# Patient Record
Sex: FEMALE | Race: OTHER | Employment: PART TIME | URBAN - METROPOLITAN AREA
[De-identification: names, ages, dates, MRNs, and addresses within clinical notes are randomized per-mention and may not be internally consistent; named-entity substitution may affect disease eponyms.]

---
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
## 2021-11-15 ENCOUNTER — HOSPITAL ENCOUNTER (OUTPATIENT)
Age: 21
Setting detail: OBSERVATION
Discharge: HOME | End: 2021-11-16
Attending: LEGAL MEDICINE | Admitting: LEGAL MEDICINE
Payer: MEDICAID

## 2021-11-15 PROBLEM — M54.9 BACK PAIN: Status: ACTIVE | Noted: 2021-11-15

## 2021-11-15 PROBLEM — Z3A.31 31 WEEKS GESTATION OF PREGNANCY: Status: ACTIVE | Noted: 2021-11-15

## 2021-11-15 LAB
ABO/RH: NORMAL
ALBUMIN SERPL-MCNC: 3.3 G/DL (ref 3.5–5.2)
ALP BLD-CCNC: 171 U/L (ref 35–104)
ALT SERPL-CCNC: 5 U/L (ref 0–32)
AMPHETAMINE SCREEN, URINE: NOT DETECTED
ANION GAP SERPL CALCULATED.3IONS-SCNC: 14 MMOL/L (ref 7–16)
ANTIBODY SCREEN: NORMAL
AST SERPL-CCNC: 8 U/L (ref 0–31)
BACTERIA: ABNORMAL /HPF
BARBITURATE SCREEN URINE: NOT DETECTED
BASOPHILS ABSOLUTE: 0.02 E9/L (ref 0–0.2)
BASOPHILS RELATIVE PERCENT: 0.2 % (ref 0–2)
BENZODIAZEPINE SCREEN, URINE: NOT DETECTED
BILIRUB SERPL-MCNC: <0.2 MG/DL (ref 0–1.2)
BILIRUBIN URINE: NEGATIVE
BLOOD, URINE: ABNORMAL
BUN BLDV-MCNC: 7 MG/DL (ref 6–20)
CALCIUM SERPL-MCNC: 8.5 MG/DL (ref 8.6–10.2)
CANNABINOID SCREEN URINE: POSITIVE
CHLORIDE BLD-SCNC: 103 MMOL/L (ref 98–107)
CLARITY: ABNORMAL
CLUE CELLS: NORMAL
CO2: 19 MMOL/L (ref 22–29)
COCAINE METABOLITE SCREEN URINE: NOT DETECTED
COLOR: YELLOW
CREAT SERPL-MCNC: 0.6 MG/DL (ref 0.5–1)
EOSINOPHILS ABSOLUTE: 0.02 E9/L (ref 0.05–0.5)
EOSINOPHILS RELATIVE PERCENT: 0.2 % (ref 0–6)
FENTANYL SCREEN, URINE: NOT DETECTED
FETAL FIBRONECTIN: NEGATIVE
GFR AFRICAN AMERICAN: >60
GFR NON-AFRICAN AMERICAN: >60 ML/MIN/1.73
GLUCOSE BLD-MCNC: 74 MG/DL (ref 74–99)
GLUCOSE URINE: NEGATIVE MG/DL
HCT VFR BLD CALC: 28.1 % (ref 34–48)
HEMOGLOBIN: 9.1 G/DL (ref 11.5–15.5)
IMMATURE GRANULOCYTES #: 0.07 E9/L
IMMATURE GRANULOCYTES %: 0.6 % (ref 0–5)
KETONES, URINE: NEGATIVE MG/DL
LEUKOCYTE ESTERASE, URINE: ABNORMAL
LYMPHOCYTES ABSOLUTE: 1.49 E9/L (ref 1.5–4)
LYMPHOCYTES RELATIVE PERCENT: 11.7 % (ref 20–42)
Lab: ABNORMAL
MCH RBC QN AUTO: 28.3 PG (ref 26–35)
MCHC RBC AUTO-ENTMCNC: 32.4 % (ref 32–34.5)
MCV RBC AUTO: 87.3 FL (ref 80–99.9)
METHADONE SCREEN, URINE: NOT DETECTED
MONOCYTES ABSOLUTE: 0.96 E9/L (ref 0.1–0.95)
MONOCYTES RELATIVE PERCENT: 7.6 % (ref 2–12)
NEUTROPHILS ABSOLUTE: 10.15 E9/L (ref 1.8–7.3)
NEUTROPHILS RELATIVE PERCENT: 79.7 % (ref 43–80)
NITRITE, URINE: POSITIVE
OPIATE SCREEN URINE: NOT DETECTED
OXYCODONE URINE: NOT DETECTED
PDW BLD-RTO: 13.8 FL (ref 11.5–15)
PH UA: 6.5 (ref 5–9)
PHENCYCLIDINE SCREEN URINE: NOT DETECTED
PLATELET # BLD: 465 E9/L (ref 130–450)
PMV BLD AUTO: 10.8 FL (ref 7–12)
POTASSIUM SERPL-SCNC: 4.1 MMOL/L (ref 3.5–5)
PROTEIN UA: 100 MG/DL
RAPID HIV 1&2: NORMAL
RBC # BLD: 3.22 E12/L (ref 3.5–5.5)
RBC UA: ABNORMAL /HPF (ref 0–2)
SODIUM BLD-SCNC: 136 MMOL/L (ref 132–146)
SOURCE WET PREP: NORMAL
SPECIFIC GRAVITY UA: 1.02 (ref 1–1.03)
TOTAL PROTEIN: 7.4 G/DL (ref 6.4–8.3)
TRICHOMONAS PREP: NORMAL
UROBILINOGEN, URINE: 1 E.U./DL
WBC # BLD: 12.7 E9/L (ref 4.5–11.5)
WBC UA: ABNORMAL /HPF (ref 0–5)
YEAST WET PREP: NORMAL

## 2021-11-15 PROCEDURE — 87591 N.GONORRHOEAE DNA AMP PROB: CPT

## 2021-11-15 PROCEDURE — 87491 CHLMYD TRACH DNA AMP PROBE: CPT

## 2021-11-15 PROCEDURE — 96361 HYDRATE IV INFUSION ADD-ON: CPT

## 2021-11-15 PROCEDURE — 87088 URINE BACTERIA CULTURE: CPT

## 2021-11-15 PROCEDURE — 87077 CULTURE AEROBIC IDENTIFY: CPT

## 2021-11-15 PROCEDURE — 83020 HEMOGLOBIN ELECTROPHORESIS: CPT

## 2021-11-15 PROCEDURE — 86592 SYPHILIS TEST NON-TREP QUAL: CPT

## 2021-11-15 PROCEDURE — 6360000002 HC RX W HCPCS: Performed by: LEGAL MEDICINE

## 2021-11-15 PROCEDURE — 86803 HEPATITIS C AB TEST: CPT

## 2021-11-15 PROCEDURE — G0480 DRUG TEST DEF 1-7 CLASSES: HCPCS

## 2021-11-15 PROCEDURE — 80053 COMPREHEN METABOLIC PANEL: CPT

## 2021-11-15 PROCEDURE — 87340 HEPATITIS B SURFACE AG IA: CPT

## 2021-11-15 PROCEDURE — 81001 URINALYSIS AUTO W/SCOPE: CPT

## 2021-11-15 PROCEDURE — 2580000003 HC RX 258: Performed by: LEGAL MEDICINE

## 2021-11-15 PROCEDURE — 6370000000 HC RX 637 (ALT 250 FOR IP): Performed by: LEGAL MEDICINE

## 2021-11-15 PROCEDURE — 96360 HYDRATION IV INFUSION INIT: CPT

## 2021-11-15 PROCEDURE — 36415 COLL VENOUS BLD VENIPUNCTURE: CPT

## 2021-11-15 PROCEDURE — 87147 CULTURE TYPE IMMUNOLOGIC: CPT

## 2021-11-15 PROCEDURE — 85025 COMPLETE CBC W/AUTO DIFF WBC: CPT

## 2021-11-15 PROCEDURE — 96365 THER/PROPH/DIAG IV INF INIT: CPT

## 2021-11-15 PROCEDURE — 87210 SMEAR WET MOUNT SALINE/INK: CPT

## 2021-11-15 PROCEDURE — 96376 TX/PRO/DX INJ SAME DRUG ADON: CPT

## 2021-11-15 PROCEDURE — 86701 HIV-1ANTIBODY: CPT

## 2021-11-15 PROCEDURE — 87081 CULTURE SCREEN ONLY: CPT

## 2021-11-15 PROCEDURE — 86900 BLOOD TYPING SEROLOGIC ABO: CPT

## 2021-11-15 PROCEDURE — 86762 RUBELLA ANTIBODY: CPT

## 2021-11-15 PROCEDURE — 76815 OB US LIMITED FETUS(S): CPT

## 2021-11-15 PROCEDURE — 85460 HEMOGLOBIN FETAL: CPT

## 2021-11-15 PROCEDURE — 82731 ASSAY OF FETAL FIBRONECTIN: CPT

## 2021-11-15 PROCEDURE — 86850 RBC ANTIBODY SCREEN: CPT

## 2021-11-15 PROCEDURE — 83021 HEMOGLOBIN CHROMOTOGRAPHY: CPT

## 2021-11-15 PROCEDURE — 86901 BLOOD TYPING SEROLOGIC RH(D): CPT

## 2021-11-15 PROCEDURE — 87186 SC STD MICRODIL/AGAR DIL: CPT

## 2021-11-15 PROCEDURE — 80307 DRUG TEST PRSMV CHEM ANLYZR: CPT

## 2021-11-15 PROCEDURE — G0378 HOSPITAL OBSERVATION PER HR: HCPCS

## 2021-11-15 PROCEDURE — 81269 HBA1/HBA2 GENE DUP/DEL VRNTS: CPT

## 2021-11-15 RX ORDER — SODIUM CHLORIDE, SODIUM LACTATE, POTASSIUM CHLORIDE, CALCIUM CHLORIDE 600; 310; 30; 20 MG/100ML; MG/100ML; MG/100ML; MG/100ML
INJECTION, SOLUTION INTRAVENOUS CONTINUOUS
Status: DISCONTINUED | OUTPATIENT
Start: 2021-11-15 | End: 2021-11-16 | Stop reason: HOSPADM

## 2021-11-15 RX ORDER — ACETAMINOPHEN 500 MG
1000 TABLET ORAL EVERY 6 HOURS PRN
Status: DISCONTINUED | OUTPATIENT
Start: 2021-11-15 | End: 2021-11-16 | Stop reason: HOSPADM

## 2021-11-15 RX ADMIN — ACETAMINOPHEN 1000 MG: 500 TABLET ORAL at 15:18

## 2021-11-15 RX ADMIN — SODIUM CHLORIDE, POTASSIUM CHLORIDE, SODIUM LACTATE AND CALCIUM CHLORIDE: 600; 310; 30; 20 INJECTION, SOLUTION INTRAVENOUS at 14:30

## 2021-11-15 RX ADMIN — SODIUM CHLORIDE 3000 MG: 900 INJECTION INTRAVENOUS at 23:53

## 2021-11-15 RX ADMIN — SODIUM CHLORIDE 3000 MG: 900 INJECTION INTRAVENOUS at 17:23

## 2021-11-15 ASSESSMENT — PAIN DESCRIPTION - DESCRIPTORS: DESCRIPTORS: ACHING;DISCOMFORT

## 2021-11-15 ASSESSMENT — PAIN SCALES - GENERAL: PAINLEVEL_OUTOF10: 4

## 2021-11-15 NOTE — PROGRESS NOTES
21 yo  Previous  here to L&D with c/o onset back pain during the night. States she awoke due to c/o heartburn but then started having generalized back discomfort . Also reports lower abdominal pressure feeling and urinary frequency. States she had  N&V shortly thereafter once but denies it on arrival to unit. Reports feeling good fetal activity. States she did not know she was pregnant and went to [facility] to have test to confirm her pregnancy because she was feeling movements. She then scheduled her initial appointment with Lauren Aguilar for tomorrow 21. Appears uncomfortable on arrival. Oriented to Triage-3 then changed into gown and voided in bathroom with urine specimen obtained. Assisted into bed with EFM applied. Call bell in reach.

## 2021-11-15 NOTE — PROGRESS NOTES
Patient with history of recurrent GBS on urine cultures with last pregnancy. U/A c/w UTI. Will start IV Unasyn.

## 2021-11-15 NOTE — PROGRESS NOTES
Dinner tray taken. Emesis 300ml liquid and partially digested food. Denies nausea. States she feels better now since having emesis. Resting quietly.

## 2021-11-15 NOTE — H&P
[id number]  Estimated sonar age 26 weeks.   EDC 1/12/22 with error rate of +/- 3 weeks due to late gestation  [place] in vertex presentation  Anterior grade 2 placenta  Male  EFW 1946 g ( 4 lb 5 oz) +/- 284 g (10 oz)

## 2021-11-15 NOTE — H&P
[address] [place] [address] Mark                              HISTORY AND PHYSICAL    PATIENT NAME: Yary Beth                     :        2000  MED REC NO:   [id number]                            ROOM:       [facility]  ACCOUNT NO:   [de-identified]                           ADMIT DATE: 11/15/2021. PROVIDER:     Kezia Goncalves MD    HISTORY OF PRESENT ILLNESS:  A 81-year-old black female presented on  11/15/2021 with complaints of low central back pain that felt like it  was radiating into her buttocks. The pain had started since 3 days ago. The pain was not radiating. She had not taken anything for the pain. She has a history of back pain in the past, and usually takes Tylenol  and a heating pad and goes to bed. She has not tried it this time. She stated she thought she was pregnant a month ago. She has not had  any prenatal care thus far with the pregnancy. However, she had called  the office in May for an appointment for evaluation for pregnancy per my records. No  fevers or chills. No change in her bowel or urinary habits. No  dysuria. She has frequency and urgency. No chest pain or shortness of  breath. No calf pain. No leaking fluid or vaginal bleeding. PAST MEDICAL HISTORY:  Negative. PAST SURGICAL HISTORY:   performed at 26-1/2 weeks secondary to   premature rupture of membranes and fetal distress. PAST GYNECOLOGIC HISTORY:  Denies DVT, PE, or STDs. SOCIAL HISTORY:  Positive for tobacco, less than a pack per day for 2  years. Denies alcohol or drugs. FAMILY HISTORY:  Negative    ALLERGIES:  No known drug allergies. MEDICATIONS:  Prenatal vitamins. REVIEW OF SYSTEMS:  Negative for cardiac, respiratory, GI, ,  neurologic, psychiatric, ENT, integument, hematologic, lymphatic, or  constitutional abnormalities.   She states she has not gotten the COVID  vaccine and has not been exposed to [place]. PHYSICAL EXAMINATION:  VITAL SIGNS:  Stable. Blood pressure is 108/66, pulse is 102,  respiratory rate 16, temperature 98. 1. HEENT:  Negative. LUNGS:  Clear to auscultation. CV:  Regular rate rhythm. ABDOMEN:  Gravid, soft, nontender. No rebound or guarding. Normal  bowel sounds are present. Cervix is closed and long. EXTREMITIES:  No clubbing, cyanosis, or edema. NEUROLOGIC:  CN II through XII are grossly intact. She is alert and  oriented x4. BACK:  No CVA tenderness. ASSESSMENT:  The patient with unknown gestation with complaints of back  pain. History of . History of  birth. PLAN:  OB ultrasound and biophysical profile. Prenatal panel. CMP. UA. Kleihauer-Betke. Fetal fibronectin. GC and Chlamydia testing. Wet prep. Further management based on clinical findings. OB ultrasound was performed by me with finding of a uriostegui in vertex  presentation with good fetal cardiac activity and movement noted. Anterior grade 2 placenta was noted. The biparietal diameter is 7.5  consistent with 30.1 weeks. OFD is 9.91 cm consistent with 30.3 weeks. Head circumference is 28.14 cm consistent with 30.6 weeks. Abdominal  circumference is 28.41 cm consistent with 32.3 weeks. Femur length is  6.45 cm consistent with 33.2 weeks. The estimated sonar age is 30  weeks. Estimated fetal weight is 1946 gm, which within an error rate of  284 gm and that is equivalent to 4 pounds 5 ounces with an error rate of  10 ounces. Assessment on ultrasound, level 1, is intrauterine pregnancy  at 32 weeks on 11/15/2021, giving her an estimated due date of  01/10/2022, which is accurate plus or minus 3 weeks given the late gestational age of  performance of the ultrasound. Biophysical profile was performed by me with finding of a uriostegui in  vertex presentation with good fetal cardiac activity and movement noted. Anterior grade 2 placenta was noted.   Amniotic fluid indices were 6.06  cm, 5.46 cm, 5.29 cm, and 5.39 cm respectively. Biophysical profile was  8/8 with +2 for fetal tone, breathing movement, and amniotic fluid  pocket greater than 2 cm. Assessment on biophysical profile is normal  biophysical profile with mild polyhydramnios. PLAN:  We will go ahead and await the testing. We will administer  Tylenol and a heating pad. Prenatal profile has been drawn. Further  management based on clinical findings.         Burton Avalos MD    D: 11/15/2021 15:10:06       T: 11/15/2021 15:13:58     [email or web address]  Job#: [id number]     Doc#: [id number]    CC:

## 2021-11-16 VITALS
HEIGHT: 64 IN | BODY MASS INDEX: 26.63 KG/M2 | TEMPERATURE: 98.5 F | SYSTOLIC BLOOD PRESSURE: 111 MMHG | DIASTOLIC BLOOD PRESSURE: 56 MMHG | HEART RATE: 90 BPM | WEIGHT: 156 LBS | RESPIRATION RATE: 18 BRPM

## 2021-11-16 LAB
HEPATITIS B SURFACE ANTIGEN INTERPRETATION: NORMAL
HEPATITIS C ANTIBODY INTERPRETATION: NORMAL
INTEGRITY CHECK, CREATININE, URINE: 102.2
INTEGRITY CHECK, OXIDANT, URINE: 64
INTEGRITY CHECK, PH, URINE: 6 (ref 4.5–9)
INTEGRITY CHECK, SPECIFIC GRAVITY, URINE: 1.01 (ref 1–1.03)
INTEGRITY CHECK, SPECIMEN INTEGRITY, URINE: NORMAL
KLEIHAUER BETKE: NORMAL
RPR: NORMAL
RUBELLA ANTIBODY IGG: NORMAL

## 2021-11-16 PROCEDURE — 6360000002 HC RX W HCPCS: Performed by: LEGAL MEDICINE

## 2021-11-16 PROCEDURE — 99211 OFF/OP EST MAY X REQ PHY/QHP: CPT

## 2021-11-16 PROCEDURE — 96366 THER/PROPH/DIAG IV INF ADDON: CPT

## 2021-11-16 PROCEDURE — 96367 TX/PROPH/DG ADDL SEQ IV INF: CPT

## 2021-11-16 PROCEDURE — 2580000003 HC RX 258: Performed by: LEGAL MEDICINE

## 2021-11-16 PROCEDURE — G0378 HOSPITAL OBSERVATION PER HR: HCPCS

## 2021-11-16 RX ADMIN — SODIUM CHLORIDE 3000 MG: 900 INJECTION INTRAVENOUS at 05:40

## 2021-11-16 RX ADMIN — SODIUM CHLORIDE 3000 MG: 900 INJECTION INTRAVENOUS at 17:30

## 2021-11-16 RX ADMIN — SODIUM CHLORIDE 3000 MG: 900 INJECTION INTRAVENOUS at 11:30

## 2021-11-16 NOTE — PROGRESS NOTES
Assumed care of patient for this shift. Plan of care for night discussed, patient verbalizes understanding. Patient perceives fetal movement and audible per Rn. EFM on, assessed and monitored every 15 minutes. Patient denies any discomfort at this time. Call light placed within reach.

## 2021-11-16 NOTE — PROGRESS NOTES
Patient states feeling indigestion/nausea. Patient states milk helps and requesting milk. Milk given.

## 2021-11-16 NOTE — PROGRESS NOTES
Spoke with dr Arielle Purcell about patient side-lying and changing positions for sleep. Lum Romance for intermittent monitoring.

## 2021-11-16 NOTE — PROGRESS NOTES
Assuming care of patient. Maternal perception of fetal movement noted. Patient denies bleeding, leaking of fluid, contractions, or any other complaints. Call light in reach.

## 2021-11-17 LAB
ORGANISM: ABNORMAL
URINE CULTURE, ROUTINE: ABNORMAL

## 2021-11-17 NOTE — DISCHARGE SUMMARY
[address]                               DISCHARGE SUMMARY    PATIENT NAME: Ghassan Kahn                     :        2000  MED REC NO:   [id number]                            ROOM:       [facility]03  ACCOUNT NO:   [de-identified]                           ADMIT DATE: 11/15/2021. PROVIDER:     Yoana Munoz MD                  DISCHARGE DATE:      ADMITTING DIAGNOSES:  Intrauterine pregnancy at 31-1/2 weeks with low  back pain. No prenatal care. Previous  with T incision. History of recurrent group B strep UTIs with the last pregnancy. History of pelvic abscess and severe infection after her last . History of  birth and premature rupture of membranes with the  last infant. DISCHARGE DIAGNOSES:  Intrauterine pregnancy at 31-1/2 weeks with low  back pain. No prenatal care. Previous  with T incision. History of recurrent group B strep UTIs with the last pregnancy. History of pelvic abscess and severe infection after her last . History of  birth and premature rupture of membranes with the  last infant. The patient threatening to leave against medical advice. HOSPITAL COURSE IN DETAIL:  The patient states her back pain has  resolved. Has good fetal movement. No contractions. Feels well. Wants to go home as she does not have a  for her infant. Reviewed with her that I would recommend to continue IV antibiotics  until tomorrow, given the severity of the infection she had with the  last infant. She is thinking about complying with the recommendation,  but is threatening to leave against medical advice. She does understand  the risk of  birth, premature rupture of membranes and all  consequences of her decision to leave against medical advice. Risk of  fetal demise has also been reviewed with her.   The urine cultures  growing out gram-negative rods, 75,000 in number. Identification and  sensitivity is to follow.         Thomas Singh MD    D: 11/16/2021 20:03:29       T: 11/16/2021 20:05:51     [email or web address]  Job#: [id number]     Doc#: [id number]    CC:

## 2021-11-17 NOTE — PROGRESS NOTES
Dr Bryce Decker on unit. Patient seen, and patient requesting to leave due to no  for her child at home. Dr Bryce Decker educated patient on importance on antibiotic therapy for current UTI due to medical history of infection with last pregnancy. Patient states she will try to find a  to stay, but was doubtful.

## 2021-11-18 LAB — GROUP B STREP CULTURE: NORMAL

## 2021-11-19 LAB
C. TRACHOMATIS DNA ,URINE: ABNORMAL
N. GONORRHOEAE DNA, URINE: ABNORMAL
SOURCE: ABNORMAL

## 2021-11-23 LAB — CANNABINOIDS CONF, URINE: 375 NG/ML

## 2021-12-01 LAB
Lab: NORMAL
REPORT: NORMAL
THIS TEST SENT TO: NORMAL

## 2021-12-11 ENCOUNTER — APPOINTMENT (OUTPATIENT)
Dept: ULTRASOUND IMAGING | Age: 21
DRG: 566 | End: 2021-12-11
Payer: MEDICAID

## 2021-12-11 ENCOUNTER — HOSPITAL ENCOUNTER (INPATIENT)
Age: 21
LOS: 2 days | Discharge: HOME | DRG: 566 | End: 2021-12-15
Attending: LEGAL MEDICINE | Admitting: LEGAL MEDICINE
Payer: MEDICAID

## 2021-12-11 PROBLEM — Z3A.35 35 WEEKS GESTATION OF PREGNANCY: Status: ACTIVE | Noted: 2021-12-11

## 2021-12-11 LAB
ABO/RH: NORMAL
ALBUMIN SERPL-MCNC: 3.4 G/DL (ref 3.5–5.2)
ALP BLD-CCNC: 184 U/L (ref 35–104)
ALT SERPL-CCNC: 7 U/L (ref 0–32)
AMPHETAMINE SCREEN, URINE: NOT DETECTED
ANION GAP SERPL CALCULATED.3IONS-SCNC: 12 MMOL/L (ref 7–16)
ANTIBODY SCREEN: NORMAL
AST SERPL-CCNC: 12 U/L (ref 0–31)
BACTERIA: ABNORMAL /HPF
BARBITURATE SCREEN URINE: NOT DETECTED
BASOPHILS ABSOLUTE: 0.02 E9/L (ref 0–0.2)
BASOPHILS RELATIVE PERCENT: 0.1 % (ref 0–2)
BENZODIAZEPINE SCREEN, URINE: NOT DETECTED
BILIRUB SERPL-MCNC: <0.2 MG/DL (ref 0–1.2)
BILIRUBIN URINE: NEGATIVE
BLOOD, URINE: ABNORMAL
BUN BLDV-MCNC: 10 MG/DL (ref 6–20)
CALCIUM SERPL-MCNC: 8.7 MG/DL (ref 8.6–10.2)
CANNABINOID SCREEN URINE: POSITIVE
CHLORIDE BLD-SCNC: 103 MMOL/L (ref 98–107)
CLARITY: ABNORMAL
CO2: 21 MMOL/L (ref 22–29)
COCAINE METABOLITE SCREEN URINE: NOT DETECTED
COLOR: ABNORMAL
CREAT SERPL-MCNC: 1 MG/DL (ref 0.5–1)
EOSINOPHILS ABSOLUTE: 0 E9/L (ref 0.05–0.5)
EOSINOPHILS RELATIVE PERCENT: 0 % (ref 0–6)
EPITHELIAL CELLS, UA: ABNORMAL /HPF
FENTANYL SCREEN, URINE: NOT DETECTED
GFR AFRICAN AMERICAN: >60
GFR NON-AFRICAN AMERICAN: >60 ML/MIN/1.73
GLUCOSE BLD-MCNC: 73 MG/DL (ref 74–99)
GLUCOSE URINE: NEGATIVE MG/DL
HCT VFR BLD CALC: 30.7 % (ref 34–48)
HEMOGLOBIN: 9.8 G/DL (ref 11.5–15.5)
IMMATURE GRANULOCYTES #: 0.07 E9/L
IMMATURE GRANULOCYTES %: 0.5 % (ref 0–5)
KETONES, URINE: NEGATIVE MG/DL
LEUKOCYTE ESTERASE, URINE: ABNORMAL
LYMPHOCYTES ABSOLUTE: 1.04 E9/L (ref 1.5–4)
LYMPHOCYTES RELATIVE PERCENT: 6.9 % (ref 20–42)
Lab: ABNORMAL
MCH RBC QN AUTO: 27.3 PG (ref 26–35)
MCHC RBC AUTO-ENTMCNC: 31.9 % (ref 32–34.5)
MCV RBC AUTO: 85.5 FL (ref 80–99.9)
METHADONE SCREEN, URINE: NOT DETECTED
MONOCYTES ABSOLUTE: 0.98 E9/L (ref 0.1–0.95)
MONOCYTES RELATIVE PERCENT: 6.5 % (ref 2–12)
NEUTROPHILS ABSOLUTE: 12.86 E9/L (ref 1.8–7.3)
NEUTROPHILS RELATIVE PERCENT: 86 % (ref 43–80)
NITRITE, URINE: POSITIVE
OPIATE SCREEN URINE: NOT DETECTED
OXYCODONE URINE: NOT DETECTED
PDW BLD-RTO: 14.4 FL (ref 11.5–15)
PH UA: 6 (ref 5–9)
PHENCYCLIDINE SCREEN URINE: NOT DETECTED
PLATELET # BLD: 352 E9/L (ref 130–450)
PMV BLD AUTO: 11.3 FL (ref 7–12)
POTASSIUM REFLEX MAGNESIUM: 4.4 MMOL/L (ref 3.5–5)
PROTEIN UA: NEGATIVE MG/DL
RBC # BLD: 3.59 E12/L (ref 3.5–5.5)
RBC UA: ABNORMAL /HPF (ref 0–2)
SARS-COV-2, NAAT: NOT DETECTED
SODIUM BLD-SCNC: 136 MMOL/L (ref 132–146)
SPECIFIC GRAVITY UA: 1.01 (ref 1–1.03)
TOTAL PROTEIN: 7.6 G/DL (ref 6.4–8.3)
UROBILINOGEN, URINE: 1 E.U./DL
WBC # BLD: 15 E9/L (ref 4.5–11.5)
WBC UA: >20 /HPF (ref 0–5)

## 2021-12-11 PROCEDURE — 87147 CULTURE TYPE IMMUNOLOGIC: CPT

## 2021-12-11 PROCEDURE — 81001 URINALYSIS AUTO W/SCOPE: CPT

## 2021-12-11 PROCEDURE — 96361 HYDRATE IV INFUSION ADD-ON: CPT

## 2021-12-11 PROCEDURE — 86850 RBC ANTIBODY SCREEN: CPT

## 2021-12-11 PROCEDURE — 87081 CULTURE SCREEN ONLY: CPT

## 2021-12-11 PROCEDURE — 87088 URINE BACTERIA CULTURE: CPT

## 2021-12-11 PROCEDURE — G0378 HOSPITAL OBSERVATION PER HR: HCPCS

## 2021-12-11 PROCEDURE — 87491 CHLMYD TRACH DNA AMP PROBE: CPT

## 2021-12-11 PROCEDURE — 76805 OB US >/= 14 WKS SNGL FETUS: CPT

## 2021-12-11 PROCEDURE — 87635 SARS-COV-2 COVID-19 AMP PRB: CPT

## 2021-12-11 PROCEDURE — 96375 TX/PRO/DX INJ NEW DRUG ADDON: CPT

## 2021-12-11 PROCEDURE — 83605 ASSAY OF LACTIC ACID: CPT

## 2021-12-11 PROCEDURE — 80307 DRUG TEST PRSMV CHEM ANLYZR: CPT

## 2021-12-11 PROCEDURE — 87186 SC STD MICRODIL/AGAR DIL: CPT

## 2021-12-11 PROCEDURE — 87591 N.GONORRHOEAE DNA AMP PROB: CPT

## 2021-12-11 PROCEDURE — 96360 HYDRATION IV INFUSION INIT: CPT

## 2021-12-11 PROCEDURE — 86901 BLOOD TYPING SEROLOGIC RH(D): CPT

## 2021-12-11 PROCEDURE — 36415 COLL VENOUS BLD VENIPUNCTURE: CPT

## 2021-12-11 PROCEDURE — 96367 TX/PROPH/DG ADDL SEQ IV INF: CPT

## 2021-12-11 PROCEDURE — 2580000003 HC RX 258: Performed by: LEGAL MEDICINE

## 2021-12-11 PROCEDURE — 6370000000 HC RX 637 (ALT 250 FOR IP): Performed by: LEGAL MEDICINE

## 2021-12-11 PROCEDURE — 6360000002 HC RX W HCPCS: Performed by: LEGAL MEDICINE

## 2021-12-11 PROCEDURE — 76819 FETAL BIOPHYS PROFIL W/O NST: CPT

## 2021-12-11 PROCEDURE — 86900 BLOOD TYPING SEROLOGIC ABO: CPT

## 2021-12-11 PROCEDURE — 96365 THER/PROPH/DIAG IV INF INIT: CPT

## 2021-12-11 PROCEDURE — 85025 COMPLETE CBC W/AUTO DIFF WBC: CPT

## 2021-12-11 PROCEDURE — G0480 DRUG TEST DEF 1-7 CLASSES: HCPCS

## 2021-12-11 PROCEDURE — 80053 COMPREHEN METABOLIC PANEL: CPT

## 2021-12-11 PROCEDURE — 87040 BLOOD CULTURE FOR BACTERIA: CPT

## 2021-12-11 RX ORDER — ONDANSETRON 2 MG/ML
4 INJECTION INTRAMUSCULAR; INTRAVENOUS EVERY 6 HOURS PRN
Status: DISCONTINUED | OUTPATIENT
Start: 2021-12-11 | End: 2021-12-15 | Stop reason: HOSPADM

## 2021-12-11 RX ORDER — ACETAMINOPHEN 325 MG/1
650 TABLET ORAL EVERY 4 HOURS PRN
Status: DISCONTINUED | OUTPATIENT
Start: 2021-12-11 | End: 2021-12-15 | Stop reason: HOSPADM

## 2021-12-11 RX ORDER — SODIUM CHLORIDE, SODIUM LACTATE, POTASSIUM CHLORIDE, CALCIUM CHLORIDE 600; 310; 30; 20 MG/100ML; MG/100ML; MG/100ML; MG/100ML
INJECTION, SOLUTION INTRAVENOUS CONTINUOUS
Status: DISCONTINUED | OUTPATIENT
Start: 2021-12-11 | End: 2021-12-15 | Stop reason: HOSPADM

## 2021-12-11 RX ADMIN — SODIUM CHLORIDE, POTASSIUM CHLORIDE, SODIUM LACTATE AND CALCIUM CHLORIDE: 600; 310; 30; 20 INJECTION, SOLUTION INTRAVENOUS at 20:45

## 2021-12-11 RX ADMIN — ONDANSETRON 4 MG: 2 INJECTION INTRAMUSCULAR; INTRAVENOUS at 22:27

## 2021-12-11 RX ADMIN — ACETAMINOPHEN 650 MG: 325 TABLET ORAL at 21:25

## 2021-12-11 RX ADMIN — CEFTRIAXONE 2000 MG: 2 INJECTION, POWDER, FOR SOLUTION INTRAMUSCULAR; INTRAVENOUS at 21:37

## 2021-12-11 ASSESSMENT — PAIN SCALES - GENERAL: PAINLEVEL_OUTOF10: 0

## 2021-12-12 ENCOUNTER — APPOINTMENT (OUTPATIENT)
Dept: ULTRASOUND IMAGING | Age: 21
DRG: 566 | End: 2021-12-12
Payer: MEDICAID

## 2021-12-12 LAB — LACTIC ACID: 0.8 MMOL/L (ref 0.5–2.2)

## 2021-12-12 PROCEDURE — G0378 HOSPITAL OBSERVATION PER HR: HCPCS

## 2021-12-12 PROCEDURE — 96376 TX/PRO/DX INJ SAME DRUG ADON: CPT

## 2021-12-12 PROCEDURE — 96361 HYDRATE IV INFUSION ADD-ON: CPT

## 2021-12-12 PROCEDURE — 96365 THER/PROPH/DIAG IV INF INIT: CPT

## 2021-12-12 PROCEDURE — 6370000000 HC RX 637 (ALT 250 FOR IP): Performed by: LEGAL MEDICINE

## 2021-12-12 PROCEDURE — 96366 THER/PROPH/DIAG IV INF ADDON: CPT

## 2021-12-12 PROCEDURE — 96367 TX/PROPH/DG ADDL SEQ IV INF: CPT

## 2021-12-12 PROCEDURE — 2580000003 HC RX 258: Performed by: LEGAL MEDICINE

## 2021-12-12 PROCEDURE — 76770 US EXAM ABDO BACK WALL COMP: CPT

## 2021-12-12 PROCEDURE — 6360000002 HC RX W HCPCS: Performed by: LEGAL MEDICINE

## 2021-12-12 RX ORDER — SODIUM CHLORIDE 0.9 % (FLUSH) 0.9 %
5-40 SYRINGE (ML) INJECTION 2 TIMES DAILY
Status: DISCONTINUED | OUTPATIENT
Start: 2021-12-12 | End: 2021-12-15 | Stop reason: HOSPADM

## 2021-12-12 RX ORDER — SODIUM CHLORIDE 0.9 % (FLUSH) 0.9 %
5-40 SYRINGE (ML) INJECTION PRN
Status: DISCONTINUED | OUTPATIENT
Start: 2021-12-12 | End: 2021-12-15 | Stop reason: HOSPADM

## 2021-12-12 RX ADMIN — SODIUM CHLORIDE 2000 MG: 9 INJECTION, SOLUTION INTRAMUSCULAR; INTRAVENOUS; SUBCUTANEOUS at 21:36

## 2021-12-12 RX ADMIN — ACETAMINOPHEN 650 MG: 325 TABLET ORAL at 07:41

## 2021-12-12 RX ADMIN — AMPICILLIN SODIUM AND SULBACTAM SODIUM 3000 MG: 2; 1 INJECTION, POWDER, FOR SOLUTION INTRAMUSCULAR; INTRAVENOUS at 10:24

## 2021-12-12 RX ADMIN — AMPICILLIN SODIUM AND SULBACTAM SODIUM 3000 MG: 2; 1 INJECTION, POWDER, FOR SOLUTION INTRAMUSCULAR; INTRAVENOUS at 04:15

## 2021-12-12 RX ADMIN — ACETAMINOPHEN 650 MG: 325 TABLET ORAL at 12:19

## 2021-12-12 RX ADMIN — AMPICILLIN SODIUM AND SULBACTAM SODIUM 3000 MG: 2; 1 INJECTION, POWDER, FOR SOLUTION INTRAMUSCULAR; INTRAVENOUS at 15:48

## 2021-12-12 RX ADMIN — AMPICILLIN SODIUM AND SULBACTAM SODIUM 3000 MG: 2; 1 INJECTION, POWDER, FOR SOLUTION INTRAMUSCULAR; INTRAVENOUS at 21:09

## 2021-12-12 RX ADMIN — ONDANSETRON 4 MG: 2 INJECTION INTRAMUSCULAR; INTRAVENOUS at 23:43

## 2021-12-12 RX ADMIN — ACETAMINOPHEN 650 MG: 325 TABLET ORAL at 15:48

## 2021-12-12 RX ADMIN — ACETAMINOPHEN 650 MG: 325 TABLET ORAL at 01:25

## 2021-12-12 RX ADMIN — SODIUM CHLORIDE, PRESERVATIVE FREE 10 ML: 5 INJECTION INTRAVENOUS at 21:42

## 2021-12-12 RX ADMIN — SODIUM CHLORIDE, PRESERVATIVE FREE 10 ML: 5 INJECTION INTRAVENOUS at 21:35

## 2021-12-12 RX ADMIN — AZITHROMYCIN DIHYDRATE 1000 MG: 500 INJECTION, POWDER, LYOPHILIZED, FOR SOLUTION INTRAVENOUS at 02:40

## 2021-12-12 ASSESSMENT — PAIN DESCRIPTION - DESCRIPTORS: DESCRIPTORS: HEADACHE

## 2021-12-12 ASSESSMENT — PAIN SCALES - GENERAL
PAINLEVEL_OUTOF10: 0
PAINLEVEL_OUTOF10: 6
PAINLEVEL_OUTOF10: 4
PAINLEVEL_OUTOF10: 0
PAINLEVEL_OUTOF10: 4
PAINLEVEL_OUTOF10: 6
PAINLEVEL_OUTOF10: 4

## 2021-12-12 NOTE — PROGRESS NOTES
Patient received with c/o pelvic pressure, pain with voiding and burning. Patient states she was here about a month ago and was treated for UTI. Patient is 35 weeks 3 days gestation. Denies contractions, leaking of fluid and states baby is active.  Dr Delisa Sanchez notified at 1406 and orders received

## 2021-12-12 NOTE — H&P
[address]                              HISTORY AND PHYSICAL    PATIENT NAME: Lisa Muniz                     :        2000  MED REC NO:   [id number]                            ROOM:       [facility]  ACCOUNT NO:   [de-identified]                           ADMIT DATE: 2021. PROVIDER:     Anika Wilkinson MD      HISTORY OF PRESENT ILLNESS:  The patient is a 52-year-old black female,  para 0-1-0-1 who presents with complaints of 3 days of dysuria and  pelvic pressure. She has a history of recurrent GBS UTIs with the last  pregnancy. She was last here on 11/15/2021 with low back pain and UTI. At that time, urine culture grew out E. coli which was sensitive to  ampicillin, cefazolin, ceftriaxone, gentamicin, nitrofurantoin, Bactrim,  and levofloxacin. She continues to be noncompliant with any office  visits and has not had any prenatal care. She began to experience  increasing low back pain 3 days ago, and then subsequently began to  experience fevers and chills 12 hours prior to admission to the  hospital.  She had one episode of emesis when she was in the hospital.   She is passing flatus. She is producing urine. She had a normal bowel  movement a day prior to presentation to the hospital.  Her discomfort in  her lower back, she rates as 4-5 when she presented to the hospital.   She denies a sore throat. She has a cough only after she vomits. She  had a headache after she vomited which was relieved with Tylenol. No  chest pain or shortness of breath. No leaking fluid or vaginal  bleeding. No contractions. No change in fetal movements. No calf  pain. No history of exposure to COVID. PAST MEDICAL HISTORY:  Negative. PAST SURGICAL HISTORY:   at 26-1/2 weeks secondary to   premature rupture of membranes and fetal distress.   She did end up  developing a pelvic infection after the , and had an abscess  that developed postop. PAST GYNECOLOGIC HISTORY:  No DVT, PE, STDs. Last intercourse was  several months ago. SOCIAL HISTORY:  Positive for tobacco.  Less than a pack a day for 2  years. Denies alcohol or drugs. Drug screens have manifested  marijuana. FAMILY HISTORY:  Noncontributory. ALLERGIES:  No known drug allergies. MEDICATIONS:  None. REVIEW OF SYSTEMS:  Negative for cardiac, respiratory, GI, neurologic,  psychiatric, ENT, integument, hematologic, lymphatic, constitutional  abnormalities. She has not received the COVID vaccine. PHYSICAL EXAMINATION:  VITAL SIGNS:  Initially when she came in, her temperature was 99.1. Currently, her temperature is 103. Her pulse is 110, blood pressure  97/54, O2 sat 100% on room air. Respiratory rate 18. HEENT:  Negative. LUNGS:  Clear to auscultation. CV:  Tachycardic, regular rhythm. No CVA tenderness. ABDOMEN:  Gravid, soft and nontender. No rebound or guarding. Normal  bowel sounds are present. PELVIC:  Fetal heart tones are present in the 200s. No contractions are  noted. EXTREMITIES:  No clubbing, cyanosis, edema. No cords or  erythema. LABORATORY DATA:  Her creatinine is 1.0, lactic acid is 0.8, ALT 7, AST  12, glucose 73. Urine drug screen positive for marijuana. White count  15, hemoglobin 9.8, platelets 050,893. UA with greater than 20 WBCs,  2-5 RBCs, rare epithelial cells, moderate bacteria, large leukocyte  esterase. OB ultrasound demonstrates a uriostegui in vertex presentation  with a left lateral placenta and amniotic fluid index of 13.73. Biophysical profile is 8/8. Estimated sonar age is 34-1/2 weeks and the  estimated fetal weight is 2483 gm which is at the 27th percentile. Biophysical profile is 8/8. ASSESSMENT:  Intrauterine pregnancy at 35 weeks and 4 days with  suspected pyelonephritis. Rocephin was started, due to sensitivities  based on her previous urine culture. Due to high creatinine, dosage of Rocephin is limited, so added Unasyn for additional coverage. IV Zithromax to cover chlamydia. PLAN:   Renal ultrasound. Infectious Disease consult. Further management based on  clinical findings. SCDs for thromboprophylaxis. Monitor for   labor.           Amari Juárez MD    D: 2021 2:51:08       T: 2021 2:55:31     [id number]  Job#: [id number]     Doc#: [id number]    CC:

## 2021-12-12 NOTE — PROGRESS NOTES
Dr. Ty Wong reviewed ultrasound, instructed to call regarding request to check the ureteral jet flow. Ultrasound called, informed RN, bladder was not full and unable to review ureter. Dr. Ty Wong informed.

## 2021-12-12 NOTE — PROGRESS NOTES
Dr. Annie Hernandez notified of temperature of 103 unresolved after tylenol administration, FHR tracing, and uterine activity. Orders received.

## 2021-12-12 NOTE — PROGRESS NOTES
Feeling better. Back pain improved. No nausea or vomiting. Voiding well. Has good fetal movement. No contractions. Temperature spiking down. LevelWeightBMI (Calculated)  21 07416 21 413051.7 [id number]69AHZL (Room air) 21 907752.5 (70.3)871138/60234 21 3040591 (39.4) 21 01104 21 9191532.4 (39.1)100 21 00514 21 2025786.4 (16)[id number]/751433 21 6229284660/26 21 6921067 (39.4) 21 21250 21 8084129.9 (38.3) 21 8308100799/57 21 2019None (Room air)0156 lb (70.8 kg)26.8 21 1942100   Lungs CTA  CV RRR  Abdomen gravid soft non-tender  FHT's 140 with +BTBV + accels              Rare contractions    A:IUP at 35.4 weeks by third trimester U/S with pyelo, appears to be responding to Rocephin and Unasyn. Rocephin dose limited by renal function. Moderate bilateral hydronephrosis. Radiologist unable to visualize ureteral jets on U/S due to position of fetal head. Suspect hydronephrosis is physiologic. Previous C/section      H/O  delivery      Noncompliant with prenatal care     P: Continue current antibiotic regimen. Regular diet.

## 2021-12-12 NOTE — CONSULTS
[facility]  Consult Note    [address] [facility] [address]  [place], [address]  Phone [phone]   [phone]      Admit Date: 2021  6:38 PM  Pt Name: Chela Jean  MRN: [id number]  : 2000  Reason for Consult:    Chief Complaint   Patient presents with    Pregnancy Problem     35 weeks 3 days  pelvic pressure     Requesting Physician:  Yoana Munoz MD  PCP: Félix Al MD  History Obtained From:  patient, chart   ID consulted for 35 weeks gestation of pregnancy [Z3A.35]  on hospital day 0  CHIEF COMPLAINT       Chief Complaint   Patient presents with    Pregnancy Problem     35 weeks 3 days  pelvic pressure     HISTORYOF PRESENT ILLNESS   Chela Jean is a 22 y.o. female who presents with   has no past medical history on file.    ED TRIAGEVITALS  BP: (!) 92/50, Temp: 98.6 °F (37 °C), Pulse: 65, Resp: 18, SpO2: 99 %  HPI  Pt came in due uti/backpain  She was admitted on - for uti E coli and left AMA she received unasyn then  Pt comes in uti sx and pressure pt had fevers [id number]  She denies /n/v/d  cr1 wbc15 hgb9.8 mkp556 ua blood nitrite le >wbc bacteria le   +cannabis  US b hydro  Previous urien cx E coli  Current meds   ampicillin-sulbactam (UNASYN) 3000 mg ivpb minibag, Q6H  cefTRIAXone (ROCEPHIN) 2,000 mg in sodium chloride (PF) 20 mL IV syringe, Q24H    She is currently comfortable eating lunch   she has no questions or concerns    REVIEW OF SYSTEMS     CONSTITUTIONAL:   No fever, chills, weight loss  ALLERGIES:    No urticaria, hay fever,    EYES:     No blurry vision, loss of vision, eye pain  ENT:      No hearing loss, sore throat  CARDIOVASCULAR:  No chest pain or palpitations  RESPIRATORY:   No cough, sob  ENDOCRINE:    No increase thirst, urination   HEME-LYMPH:   No easy bruising or bleeding  GI:     No nausea, vomiting or diarrhea  :      urinary complaints  NEURO:    No seizures, stroke, HA  MUSCULOSKELETAL:  No muscle aches or pain, no joint pain  SKIN:     No rash or itch  PSYCH:    No depression or anxiety    Medications Prior to Admission: Prenatal MV-Min-Fe Fum-FA-DHA (PRENATAL 1 PO), Take by mouth  CURRENT MEDICATIONS     Current Facility-Administered Medications:     ampicillin-sulbactam (UNASYN) 3000 mg ivpb minibag, 3,000 mg, IntraVENous, Q6H, Antonio Ridley MD, Stopped at 21 1101    cefTRIAXone (ROCEPHIN) 2,000 mg in sodium chloride (PF) 20 mL IV syringe, 2,000 mg, IntraVENous, Q24H, Antonio Ridley MD    lactated ringers infusion, , IntraVENous, Continuous, Antonio Ridley MD, Last Rate: 125 mL/hr at 21, New Bag at 21    acetaminophen (TYLENOL) tablet 650 mg, 650 mg, Oral, Q4H PRN, Antonio Ridley MD, 650 mg at 21 0741    ondansetron (ZOFRAN) injection 4 mg, 4 mg, IntraVENous, Q6H PRN, Antonio Ridley MD, 4 mg at 21 2227  ALLERGIES     Seasonal  Immunization History   Administered Date(s) Administered    Influenza, Quadv, 6 mo and older, IM, PF (Flulaval, Fluarix) 2019    MMR 2019    Tdap (Boostrix, Adacel) 2019      Internal Administration   First Dose      Second Dose           Last COVID Lab SARS-CoV-2, NAAT (no units)   Date Value   2021 Not Detected            PAST MEDICAL HISTORY   No past medical history on file.   SURGICAL HISTORY       Past Surgical History:   Procedure Laterality Date    CERVICAL CERCLAGE       SECTION N/A 2019     SECTION performed by Romayne Hobby, MD at [facility] L[facility]D    MS OFFICE/OUTPT VISIT,PROCEDURE ONLY N/A 2018    CERVIX CERCLAGE performed by Antonio Ridley MD at [address] [place] [address] HISTORY       Family History   Problem Relation Age of Onset    Colon Cancer Mother     Asthma Mother     Heart Attack Maternal Grandfather      SOCIAL HISTORY       Social History     Socioeconomic History    Marital status: Single     Spouse name: Not on file    Number of children: Not on file    Years of education: Not on file    Highest education level: Not on file   Occupational History    Not on file   Tobacco Use    Smoking status: Current Every Day Smoker     Packs/day: 0.50     Years: 3.00     Pack years: 1.50    Smokeless tobacco: Never Used   Vaping Use    Vaping Use: Never used   Substance and Sexual Activity    Alcohol use: No    Drug use: No    Sexual activity: Not Currently     Comment: placenta previa   Other Topics Concern    Not on file   Social History Narrative    Not on file     Social Determinants of Health     Financial Resource Strain:     Difficulty of Paying Living Expenses: Not on file   Food Insecurity:     Worried About Running Out of Food in the Last Year: Not on file    Leticia of Food in the Last Year: Not on file   Transportation Needs:     Lack of Transportation (Medical): Not on file    Lack of Transportation (Non-Medical):  Not on file   Physical Activity:     Days of Exercise per Week: Not on file    Minutes of Exercise per Session: Not on file   Stress:     Feeling of Stress : Not on file   Social Connections:     Frequency of Communication with Friends and Family: Not on file    Frequency of Social Gatherings with Friends and Family: Not on file    Attends Jainism Services: Not on file    Active Member of [address] [place] [address] [employer] or Organizations: Not on file    Attends Club or Organization Meetings: Not on file    Marital Status: Not on file   Intimate Partner Violence:     Fear of Current or Ex-Partner: Not on file    Emotionally Abused: Not on file    Physically Abused: Not on file    Sexually Abused: Not on file   Housing Stability:     Unable to Pay for Housing in the Last Year: Not on file    Number of [place] in the Last Year: Not on file    Unstable Housing in the Last Year: Not on file     ·     [address]       ED Triage Vitals   BP Temp Temp Source Pulse Resp SpO2 Height Weight   12/11/21 1906 12/11/21 1906 12/11/21 2324 12/11/21 1906 12/11/21 1906 12/11/21 1942 12/11/21 2019 12/11/21 2019   (!) 100/59 99.1 °F (37.3 °C) Oral 102 16 100 % 5' 4\" (1.626 m) 156 lb (70.8 kg)     Vitals:    Vitals:    12/12/21 0052 12/12/21 0150 12/12/21 0420 12/12/21 0731   BP:   (!) 96/55 (!) 92/50   Pulse:   86 65   Resp:   16 18   Temp: 102.4 °F (39.1 °C) 103 °F (39.4 °C) 98.3 °F (36.8 °C) 98.6 °F (37 °C)   TempSrc: Oral Oral Oral Oral   SpO2: 100%  100% 99%   Weight:       Height:         Physical Exam   Constitutional/General: Alert and oriented, NAD  Head: NC/AT  Eyes: PERRL, EOMI  Mouth: Normal mucosa, no thrush   Neck: Supple, full ROM,    Pulmonary: Lungs clear to auscultation bilaterally. Not in respiratory distress  Cardiovascular:  Regular rate and rhythm, no murmurs, gallops, or rubs. Abdomen: Soft, + BS.   distension. Nontender. Extremities: Moves all extremities x 4. Warm and well perfused  Pulses:  Distal pulses intact  Skin: Warm and dry without rash  Neurologic:    No focal deficits  Psych: Normal Affect     DIAGNOSTIC RESULTS   RADIOLOGY:   US OB 14 PLUS WEEKS SINGLE OR FIRST GESTATION    Result Date: 12/11/2021  EXAMINATION: TRANSABDOMINAL SECOND/THIRD TRIMESTER OBSTETRIC PELVIC ULTRASOUND WITH COLOR DOPPLER FLOW; BIOPHYSICAL PROFILE WITHOUT NON-STRESS TEST 12/11/2021 TECHNIQUE: TRANSABDOMINAL PELVIC ULTRASOUND WITH COLOR DOPPLER FLOW; ULTRASOUND BIOPHYSICAL PROFILE WITHOUT NON-STRESS TEST COMPARISON: None available. HISTORY: ORDERING SYSTEM PROVIDED HISTORY: limited prenatal visits TECHNOLOGIST PROVIDED HISTORY: Reason for exam:->limited prenatal visits What reading provider will be dictating this exam?->CRC; ORDERING SYSTEM PROVIDED HISTORY: fetal wellbeing TECHNOLOGIST PROVIDED HISTORY: With MARIAELENA please Reason for exam:->fetal wellbeing What reading provider will be dictating this exam?->CRC FINDINGS: A single live intrauterine pregnancy is present. Fetal heart rate measures 150 beats per minute. There is normal fetal body and limb movement.  The None available. HISTORY: ORDERING SYSTEM PROVIDED HISTORY: limited prenatal visits TECHNOLOGIST PROVIDED HISTORY: Reason for exam:->limited prenatal visits What reading provider will be dictating this exam?->CRC; ORDERING SYSTEM PROVIDED HISTORY: fetal wellbeing TECHNOLOGIST PROVIDED HISTORY: With MARIAELENA please Reason for exam:->fetal wellbeing What reading provider will be dictating this exam?->CRC FINDINGS: A single live intrauterine pregnancy is present. Fetal heart rate measures 150 beats per minute. There is normal fetal body and limb movement. The fetus is in cephalic position. The placenta is located left lateral. The amniotic fluid volume is visually normal and the amniotic fluid index measures 13.73 cm. The cervix is not well seen secondary to shadowing from the fetal calvarium. Systolic to diastolic ratios were 2.0, 2.2, and 2.0 (normal for gestational age). BPD measures 8.73 cm. 35 weeks and 2 days. Head circumference measures 31.23 cm. 35 weeks and 0 days. Abdominal circumference measures 30.51 cm. 34 weeks and 3 days. Femur length measures 6.75 cm. 34 weeks and 5 days. Estimated fetal weight is 2483 grams which correlates to 27th percentile based upon last menstrual period. BIOPHYSICAL PROFILE: Fetal Tone:  2/2 Gross Body:  2/2 Breathin/2 Qualitative Fluid:  2/2 Biophysical Profile Score:  8/8 Estimated gestational age by current ultrasound is 34 weeks and 6 days Estimated date of delivery based on current ultrasound: 2022 Clinical age provided: 27 weeks and 3 days Estimated date of delivery based on clinical age provided: 2022.     1.  Single live intrauterine pregnancy with gestational age of 29 weeks and 6 days by current sonographic biometry which is concordant with the gestational age provided of 27 weeks and 3 days. The estimated due date based on current ultrasound is 7922. 2.  Cephalic presentation with no evidence of previa.   Fetal heart rate was 150 beats per minute. Visually normal amniotic fluid. Estimated fetal weight was 2483 g. 3.  Normal biophysical profile, 8 of 8. Systolic to diastolic ratios ranged from 2.0-2.2.     US RETROPERITONEAL COMPLETE    Result Date: 12/12/2021  EXAMINATION: RETROPERITONEAL ULTRASOUND OF THE KIDNEYS AND URINARY BLADDER 12/12/2021 COMPARISON: January 7, 2019 HISTORY: ORDERING SYSTEM PROVIDED HISTORY: urinary tract infection FINDINGS: Kidneys: The right kidney measures 10.9 x 6.3 x 5.9 cm and the left kidney measures 13.3 x 6.9 x 7.9 cm. Kidneys demonstrate normal cortical echogenicity. Moderate bilateral hydronephrosis noted. No evidence of intrarenal stones. Bladder: The partially distended bladder is grossly unremarkable. Moderate bilateral hydronephrosis. LABS  Recent Labs     12/11/21 2115   WBC 15.0*   HGB 9.8*   HCT 30.7*   MCV 85.5        Recent Labs     12/11/21 2115      K 4.4      CO2 21*   BUN 10   CREATININE 1.0   GFRAA >60   LABGLOM >60   GLUCOSE 73*   PROT 7.6   LABALBU 3.4*   CALCIUM 8.7   BILITOT <0.2   ALKPHOS 184*   AST 12   ALT 7     No results for input(s): PROCAL in the last 72 hours.   Lab Results   Component Value Date    CRP 1.8 (H) 01/08/2019    CRP 7.3 (H) 01/01/2019     Lab Results   Component Value Date    SEDRATE 62 (H) 01/08/2019    SEDRATE 52 (H) 01/01/2019     Lab Results   Component Value Date    [id number] see below 11/15/2021     Lab Results   Component Value Date    COVID19 Not Detected 12/11/2021     COVID-19/CONSTANCE-COV2 LABS  Recent Labs     12/11/21 2115   AST 12   ALT 7     No results found for: CHOL, TRIG, HDL, LDLCALC, LABVLDL      Lab Results   Component Value Date    HCVABI Non-Reactive 11/15/2021     Hep C Ab Interp   Date Value Ref Range Status   11/15/2021 Non-Reactive Non-Reactive Final       MICROBIOLOGY:       Lab Results   Component Value Date    BC 5 Days- no growth 01/01/2019    ORG Escherichia coli 11/15/2021    ORG FILM ARR

## 2021-12-12 NOTE — PROGRESS NOTES
Results of labs called to Dr Bryce Decker. Notified of spike in temp and blood cultures sent. Notified of fhts reactive to elevated temp, now beginning to resolve at 180. Notified of vomitting and negative covid status.   New orders received

## 2021-12-13 PROBLEM — N12 PYELONEPHRITIS: Status: ACTIVE | Noted: 2021-12-13

## 2021-12-13 LAB
ALBUMIN SERPL-MCNC: 2.4 G/DL (ref 3.5–5.2)
ALP BLD-CCNC: 129 U/L (ref 35–104)
ALT SERPL-CCNC: 5 U/L (ref 0–32)
ANION GAP SERPL CALCULATED.3IONS-SCNC: 12 MMOL/L (ref 7–16)
AST SERPL-CCNC: 8 U/L (ref 0–31)
BILIRUB SERPL-MCNC: <0.2 MG/DL (ref 0–1.2)
BUN BLDV-MCNC: 7 MG/DL (ref 6–20)
CALCIUM SERPL-MCNC: 7.8 MG/DL (ref 8.6–10.2)
CHLORIDE BLD-SCNC: 106 MMOL/L (ref 98–107)
CO2: 18 MMOL/L (ref 22–29)
CREAT SERPL-MCNC: 0.8 MG/DL (ref 0.5–1)
GFR AFRICAN AMERICAN: >60
GFR NON-AFRICAN AMERICAN: >60 ML/MIN/1.73
GLUCOSE BLD-MCNC: 77 MG/DL (ref 74–99)
HCT VFR BLD CALC: 25.5 % (ref 34–48)
HEMOGLOBIN: 8.2 G/DL (ref 11.5–15.5)
INTEGRITY CHECK, CREATININE, URINE: 177.9
INTEGRITY CHECK, OXIDANT, URINE: 40
INTEGRITY CHECK, PH, URINE: 5.4 (ref 4.5–9)
INTEGRITY CHECK, SPECIFIC GRAVITY, URINE: 1.01 (ref 1–1.03)
INTEGRITY CHECK, SPECIMEN INTEGRITY, URINE: NORMAL
MCH RBC QN AUTO: 27.5 PG (ref 26–35)
MCHC RBC AUTO-ENTMCNC: 32.2 % (ref 32–34.5)
MCV RBC AUTO: 85.6 FL (ref 80–99.9)
PDW BLD-RTO: 14.6 FL (ref 11.5–15)
PLATELET # BLD: 321 E9/L (ref 130–450)
PMV BLD AUTO: 11.3 FL (ref 7–12)
POTASSIUM SERPL-SCNC: 3.5 MMOL/L (ref 3.5–5)
RBC # BLD: 2.98 E12/L (ref 3.5–5.5)
SODIUM BLD-SCNC: 136 MMOL/L (ref 132–146)
TOTAL PROTEIN: 5.6 G/DL (ref 6.4–8.3)
WBC # BLD: 13.6 E9/L (ref 4.5–11.5)

## 2021-12-13 PROCEDURE — 2580000003 HC RX 258: Performed by: LEGAL MEDICINE

## 2021-12-13 PROCEDURE — 85027 COMPLETE CBC AUTOMATED: CPT

## 2021-12-13 PROCEDURE — 6360000002 HC RX W HCPCS: Performed by: LEGAL MEDICINE

## 2021-12-13 PROCEDURE — 80053 COMPREHEN METABOLIC PANEL: CPT

## 2021-12-13 PROCEDURE — 6370000000 HC RX 637 (ALT 250 FOR IP): Performed by: LEGAL MEDICINE

## 2021-12-13 PROCEDURE — 96366 THER/PROPH/DIAG IV INF ADDON: CPT

## 2021-12-13 PROCEDURE — 96361 HYDRATE IV INFUSION ADD-ON: CPT

## 2021-12-13 PROCEDURE — 36415 COLL VENOUS BLD VENIPUNCTURE: CPT

## 2021-12-13 PROCEDURE — 1220000001 HC SEMI PRIVATE L&D R&B

## 2021-12-13 RX ORDER — FERROUS SULFATE 325(65) MG
325 TABLET ORAL 2 TIMES DAILY WITH MEALS
Status: DISCONTINUED | OUTPATIENT
Start: 2021-12-13 | End: 2021-12-15 | Stop reason: HOSPADM

## 2021-12-13 RX ORDER — MULTIVIT WITH MINERALS/LUTEIN
250 TABLET ORAL 2 TIMES DAILY
Status: DISCONTINUED | OUTPATIENT
Start: 2021-12-13 | End: 2021-12-15 | Stop reason: HOSPADM

## 2021-12-13 RX ADMIN — FERROUS SULFATE TAB 325 MG (65 MG ELEMENTAL FE) 325 MG: 325 (65 FE) TAB at 09:13

## 2021-12-13 RX ADMIN — FERROUS SULFATE TAB 325 MG (65 MG ELEMENTAL FE) 325 MG: 325 (65 FE) TAB at 17:14

## 2021-12-13 RX ADMIN — SODIUM CHLORIDE, POTASSIUM CHLORIDE, SODIUM LACTATE AND CALCIUM CHLORIDE: 600; 310; 30; 20 INJECTION, SOLUTION INTRAVENOUS at 10:43

## 2021-12-13 RX ADMIN — AMPICILLIN SODIUM AND SULBACTAM SODIUM 3000 MG: 2; 1 INJECTION, POWDER, FOR SOLUTION INTRAMUSCULAR; INTRAVENOUS at 08:21

## 2021-12-13 RX ADMIN — AMPICILLIN SODIUM AND SULBACTAM SODIUM 3000 MG: 2; 1 INJECTION, POWDER, FOR SOLUTION INTRAMUSCULAR; INTRAVENOUS at 21:05

## 2021-12-13 RX ADMIN — Medication 250 MG: at 10:15

## 2021-12-13 RX ADMIN — AMPICILLIN SODIUM AND SULBACTAM SODIUM 3000 MG: 2; 1 INJECTION, POWDER, FOR SOLUTION INTRAMUSCULAR; INTRAVENOUS at 02:29

## 2021-12-13 RX ADMIN — SODIUM CHLORIDE 2000 MG: 9 INJECTION, SOLUTION INTRAMUSCULAR; INTRAVENOUS; SUBCUTANEOUS at 22:17

## 2021-12-13 RX ADMIN — AMPICILLIN SODIUM AND SULBACTAM SODIUM 3000 MG: 2; 1 INJECTION, POWDER, FOR SOLUTION INTRAMUSCULAR; INTRAVENOUS at 14:41

## 2021-12-13 RX ADMIN — Medication 250 MG: at 21:00

## 2021-12-13 RX ADMIN — ACETAMINOPHEN 650 MG: 325 TABLET ORAL at 01:00

## 2021-12-13 ASSESSMENT — PAIN DESCRIPTION - ORIENTATION: ORIENTATION: MID;LOWER

## 2021-12-13 ASSESSMENT — PAIN DESCRIPTION - FREQUENCY: FREQUENCY: INTERMITTENT

## 2021-12-13 ASSESSMENT — PAIN SCALES - GENERAL: PAINLEVEL_OUTOF10: 2

## 2021-12-13 ASSESSMENT — PAIN DESCRIPTION - DESCRIPTORS: DESCRIPTORS: DISCOMFORT;CONSTANT

## 2021-12-13 ASSESSMENT — PAIN DESCRIPTION - PAIN TYPE: TYPE: ACUTE PAIN

## 2021-12-13 ASSESSMENT — PAIN - FUNCTIONAL ASSESSMENT: PAIN_FUNCTIONAL_ASSESSMENT: ACTIVITIES ARE NOT PREVENTED

## 2021-12-13 ASSESSMENT — PAIN DESCRIPTION - LOCATION: LOCATION: BACK

## 2021-12-13 ASSESSMENT — PAIN DESCRIPTION - ONSET: ONSET: ON-GOING

## 2021-12-13 NOTE — PROGRESS NOTES
Assumed care of patient at 0. Plan of care discussed with patient, who verbalized understanding with no questions at 0. Patient resting in bed comfortably, IV fluids infusing. Patient denies any pain, denies any nausea, and is satisfied at this time 1915. White board updated, call light within reach.

## 2021-12-13 NOTE — PROGRESS NOTES
Assuming care of patient. Patient resting in bed comfortably, denies any complaints at this time. Maternal perception of fetal movement noted. Plan of care discussed with patient; patient verbalizes understanding. Call light in reach.

## 2021-12-14 LAB
COMMENT: NORMAL
ORGANISM: ABNORMAL
THC NORMALIZED, QUANTITIATIVE, URINE: 34.2
THC-COOH, QUANTITATIVE, URINE: 60.8
URINE CULTURE, ROUTINE: ABNORMAL

## 2021-12-14 PROCEDURE — 1220000001 HC SEMI PRIVATE L&D R&B

## 2021-12-14 PROCEDURE — 6360000002 HC RX W HCPCS: Performed by: LEGAL MEDICINE

## 2021-12-14 PROCEDURE — 6370000000 HC RX 637 (ALT 250 FOR IP): Performed by: LEGAL MEDICINE

## 2021-12-14 PROCEDURE — 2580000003 HC RX 258: Performed by: LEGAL MEDICINE

## 2021-12-14 PROCEDURE — 87449 NOS EACH ORGANISM AG IA: CPT

## 2021-12-14 PROCEDURE — 87324 CLOSTRIDIUM AG IA: CPT

## 2021-12-14 PROCEDURE — 6370000000 HC RX 637 (ALT 250 FOR IP): Performed by: SPECIALIST

## 2021-12-14 RX ORDER — CEFDINIR 300 MG/1
300 CAPSULE ORAL EVERY 12 HOURS SCHEDULED
Qty: 28 CAPSULE | Refills: 0 | Status: ON HOLD | OUTPATIENT
Start: 2021-12-14 | End: 2021-12-29 | Stop reason: HOSPADM

## 2021-12-14 RX ORDER — CEFDINIR 300 MG/1
300 CAPSULE ORAL EVERY 12 HOURS SCHEDULED
Status: DISCONTINUED | OUTPATIENT
Start: 2021-12-14 | End: 2021-12-15 | Stop reason: HOSPADM

## 2021-12-14 RX ADMIN — CEFDINIR 300 MG: 300 CAPSULE ORAL at 23:37

## 2021-12-14 RX ADMIN — Medication 250 MG: at 17:30

## 2021-12-14 RX ADMIN — FERROUS SULFATE TAB 325 MG (65 MG ELEMENTAL FE) 325 MG: 325 (65 FE) TAB at 17:30

## 2021-12-14 RX ADMIN — Medication 250 MG: at 09:02

## 2021-12-14 RX ADMIN — SODIUM CHLORIDE, POTASSIUM CHLORIDE, SODIUM LACTATE AND CALCIUM CHLORIDE: 600; 310; 30; 20 INJECTION, SOLUTION INTRAVENOUS at 12:21

## 2021-12-14 RX ADMIN — AMPICILLIN SODIUM AND SULBACTAM SODIUM 3000 MG: 2; 1 INJECTION, POWDER, FOR SOLUTION INTRAMUSCULAR; INTRAVENOUS at 03:14

## 2021-12-14 RX ADMIN — CEFDINIR 300 MG: 300 CAPSULE ORAL at 11:16

## 2021-12-14 RX ADMIN — FERROUS SULFATE TAB 325 MG (65 MG ELEMENTAL FE) 325 MG: 325 (65 FE) TAB at 09:02

## 2021-12-14 ASSESSMENT — PAIN SCALES - GENERAL: PAINLEVEL_OUTOF10: 0

## 2021-12-14 NOTE — PROGRESS NOTES
[facility]    [address] [place] [address] 80  [place], [address] [place] [address] Street  Phone [phone]   [phone]      Admit Date: 2021  6:38 PM  Pt Name: Caitlin Peter  MRN: [id number]  : 2000  Reason for Consult:    Chief Complaint   Patient presents with    Pregnancy Problem     35 weeks 3 days  pelvic pressure     Requesting Physician:  Antonio Ridley MD  PCP: Darline Singh MD  History Obtained From:  patient, chart   ID consulted for 35 weeks gestation of pregnancy [Z3A.35]  Pyelonephritis [N12]  on hospital day [address]       Chief Complaint   Patient presents with    Pregnancy Problem     35 weeks 3 days  pelvic pressure     HISTORYOF PRESENT ILLNESS   Caitlin Peter is a 22 y.o. female who presents with   has no past medical history on file.    HPI  Pt came in due uti/backpain  She was admitted on - for uti E coli and left AMA she received unasyn then  Pt comes in uti sx and pressure pt had fevers [id number]  She denies /n/v/d  cr1 wbc15 hgb9.8 yck324 ua blood nitrite le >wbc bacteria le   +cannabis  US b hydro  Previous urien cx E coli  Current meds   ampicillin-sulbactam (UNASYN) 3000 mg ivpb minibag, Q6H  cefTRIAXone (ROCEPHIN) 2,000 mg in sodium chloride (PF) 20 mL IV syringe, Q24H    She is currently comfortable eating lunch   she has no questions or concerns    DOS  22   Awake and alert in bed has no c/o   Afebrile on RA  Cr0.8 wbc13.6     REVIEW OF SYSTEMS     CONSTITUTIONAL:   No fever, chills, weight loss  ALLERGIES:    No urticaria, hay fever,    EYES:     No blurry vision, loss of vision, eye pain  ENT:      No hearing loss, sore throat  CARDIOVASCULAR:  No chest pain or palpitations  RESPIRATORY:   No cough, sob  ENDOCRINE:    No increase thirst, urination   HEME-LYMPH:   No easy bruising or bleeding  GI:     No nausea, vomiting or diarrhea  :      urinary complaints  NEURO:    No seizures, stroke, HA  MUSCULOSKELETAL:  No muscle aches or pain, no joint pain  SKIN:     No rash or itch  PSYCH:    No depression or anxiety    Medications Prior to Admission: Prenatal MV-Min-Fe Fum-FA-DHA (PRENATAL 1 PO), Take by mouth  CURRENT MEDICATIONS     Current Facility-Administered Medications:     ferrous sulfate (IRON 325) tablet 325 mg, 325 mg, Oral, BID WC, Shea Ramesh MD, 325 mg at 21 1714    vitamin C tablet 250 mg, 250 mg, Oral, BID, Shea Ramesh MD, 250 mg at 21 2100    ampicillin-sulbactam (UNASYN) 3000 mg ivpb minibag, 3,000 mg, IntraVENous, Q6H, Shea Ramesh MD, Stopped at 21 0400    cefTRIAXone (ROCEPHIN) 2,000 mg in sodium chloride (PF) 20 mL IV syringe, 2,000 mg, IntraVENous, Q24H, Shea Ramesh MD, 2,000 mg at 21 2217    sodium chloride flush 0.9 % injection 5-40 mL, 5-40 mL, IntraVENous, BID, Shea Ramesh MD, 10 mL at 21 2142    sodium chloride flush 0.9 % injection 5-40 mL, 5-40 mL, IntraVENous, PRN, Shea Ramesh MD, 10 mL at 21 2135    lactated ringers infusion, , IntraVENous, Continuous, Shea Ramesh MD, Last Rate: 80 mL/hr at 21 1733, Rate Change at 21 1733    acetaminophen (TYLENOL) tablet 650 mg, 650 mg, Oral, Q4H PRN, Shea Ramesh MD, 650 mg at 21 0100    ondansetron (ZOFRAN) injection 4 mg, 4 mg, IntraVENous, Q6H PRN, Shea Ramesh MD, 4 mg at 21 2343  ALLERGIES     Seasonal  Immunization History   Administered Date(s) Administered    Influenza, Quadv, 6 mo and older, IM, PF (Flulaval, Fluarix) 2019    MMR 2019    Tdap (Boostrix, Adacel) 2019      Internal Administration   First Dose      Second Dose           Last COVID Lab SARS-CoV-2, NAAT (no units)   Date Value   2021 Not Detected            PAST MEDICAL HISTORY   No past medical history on file.   SURGICAL HISTORY       Past Surgical History:   Procedure Laterality Date    CERVICAL CERCLAGE       SECTION N/A 2019     SECTION performed [facility] at [facility] L[facility]    NV OFFICE/OUTPT VISIT,PROCEDURE ONLY N/A 11/8/2018    CERVIX CERCLAGE performed by Shea Ramesh MD at [address] [place] [address] Avenue:    12/13/21 1604 12/14/21 0014 12/14/21 0400 12/14/21 0735   BP:  (!) 104/56 111/63 101/64   Pulse:  75 80 78   Resp: 14 16 17 18   Temp: 98.3 °F (36.8 °C) 97.9 °F (36.6 °C) 98.1 °F (36.7 °C) 97.8 °F (36.6 °C)   TempSrc: Oral Oral Oral Oral   SpO2: 96% 98%     Weight:       Height:         Physical Exam   Constitutional/General: Alert and oriented, NAD  Head: NC/AT  Eyes: PERRL,    Pulmonary: Lungs clear to auscultation bilaterally. ant  Cardiovascular:  Regular rate and rhythm,   Abdomen: Soft, + BS.   distension. Extremities: Moves all extremities x 4. Skin: Warm and dry without rash  Neurologic:    No focal deficits  Psych: Normal Affect     DIAGNOSTIC RESULTS   RADIOLOGY:   US OB 14 PLUS WEEKS SINGLE OR FIRST GESTATION    Result Date: 12/11/2021  EXAMINATION: TRANSABDOMINAL SECOND/THIRD TRIMESTER OBSTETRIC PELVIC ULTRASOUND WITH COLOR DOPPLER FLOW; BIOPHYSICAL PROFILE WITHOUT NON-STRESS TEST 12/11/2021 TECHNIQUE: TRANSABDOMINAL PELVIC ULTRASOUND WITH COLOR DOPPLER FLOW; ULTRASOUND BIOPHYSICAL PROFILE WITHOUT NON-STRESS TEST COMPARISON: None available. HISTORY: ORDERING SYSTEM PROVIDED HISTORY: limited prenatal visits TECHNOLOGIST PROVIDED HISTORY: Reason for exam:->limited prenatal visits What reading provider will be dictating this exam?->CRC; ORDERING SYSTEM PROVIDED HISTORY: fetal wellbeing TECHNOLOGIST PROVIDED HISTORY: With MARIAELENA please Reason for exam:->fetal wellbeing What reading provider will be dictating this exam?->CRC FINDINGS: A single live intrauterine pregnancy is present. Fetal heart rate measures 150 beats per minute. There is normal fetal body and limb movement. The fetus is in cephalic position.  The placenta is located left lateral. The amniotic fluid volume is visually normal and the amniotic fluid index measures 13.73 cm. The cervix is not well seen secondary to shadowing from the fetal calvarium. Systolic to diastolic ratios were 2.0, 2.2, and 2.0 (normal for gestational age). BPD measures 8.73 cm. 35 weeks and 2 days. Head circumference measures 31.23 cm. 35 weeks and 0 days. Abdominal circumference measures 30.51 cm. 34 weeks and 3 days. Femur length measures 6.75 cm. 34 weeks and 5 days. Estimated fetal weight is 2483 grams which correlates to 27th percentile based upon last menstrual period. BIOPHYSICAL PROFILE: Fetal Tone:  2/2 Gross Body:  2/2 Breathin/2 Qualitative Fluid:  2/2 Biophysical Profile Score:  8/8 Estimated gestational age by current ultrasound is 34 weeks and 6 days Estimated date of delivery based on current ultrasound: 2022 Clinical age provided: 27 weeks and 3 days Estimated date of delivery based on clinical age provided: 2022.     1.  Single live intrauterine pregnancy with gestational age of 29 weeks and 6 days by current sonographic biometry which is concordant with the gestational age provided of 27 weeks and 3 days. The estimated due date based on current ultrasound is 5480. 2.  Cephalic presentation with no evidence of previa. Fetal heart rate was 150 beats per minute. Visually normal amniotic fluid. Estimated fetal weight was 2483 g. 3.  Normal biophysical profile, 8 of 8. Systolic to diastolic ratios ranged from 2.0-2.2.     US FETAL BIOPHYSICAL PROFILE WO NON STRESS TESTING    Result Date: 2021  EXAMINATION: TRANSABDOMINAL SECOND/THIRD TRIMESTER OBSTETRIC PELVIC ULTRASOUND WITH COLOR DOPPLER FLOW; BIOPHYSICAL PROFILE WITHOUT NON-STRESS TEST 2021 TECHNIQUE: TRANSABDOMINAL PELVIC ULTRASOUND WITH COLOR DOPPLER FLOW; ULTRASOUND BIOPHYSICAL PROFILE WITHOUT NON-STRESS TEST COMPARISON: None available.  HISTORY: ORDERING SYSTEM PROVIDED HISTORY: limited prenatal visits TECHNOLOGIST PROVIDED HISTORY: Reason for exam:->limited prenatal visits What reading provider will be dictating this exam?->CRC; ORDERING SYSTEM PROVIDED HISTORY: fetal wellbeing TECHNOLOGIST PROVIDED HISTORY: With MARIAELENA please Reason for exam:->fetal wellbeing What reading provider will be dictating this exam?->CRC FINDINGS: A single live intrauterine pregnancy is present. Fetal heart rate measures 150 beats per minute. There is normal fetal body and limb movement. The fetus is in cephalic position. The placenta is located left lateral. The amniotic fluid volume is visually normal and the amniotic fluid index measures 13.73 cm. The cervix is not well seen secondary to shadowing from the fetal calvarium. Systolic to diastolic ratios were 2.0, 2.2, and 2.0 (normal for gestational age). BPD measures 8.73 cm. 35 weeks and 2 days. Head circumference measures 31.23 cm. 35 weeks and 0 days. Abdominal circumference measures 30.51 cm. 34 weeks and 3 days. Femur length measures 6.75 cm. 34 weeks and 5 days. Estimated fetal weight is 2483 grams which correlates to 27th percentile based upon last menstrual period. BIOPHYSICAL PROFILE: Fetal Tone:  2/2 Gross Body:  2/2 Breathin/2 Qualitative Fluid:  2/2 Biophysical Profile Score:  8/8 Estimated gestational age by current ultrasound is 34 weeks and 6 days Estimated date of delivery based on current ultrasound: 2022 Clinical age provided: 27 weeks and 3 days Estimated date of delivery based on clinical age provided: 2022.     1.  Single live intrauterine pregnancy with gestational age of 29 weeks and 6 days by current sonographic biometry which is concordant with the gestational age provided of 27 weeks and 3 days. The estimated due date based on current ultrasound is 6709. 2.  Cephalic presentation with no evidence of previa. Fetal heart rate was 150 beats per minute. Visually normal amniotic fluid. Estimated fetal weight was 2483 g. 3.  Normal biophysical profile, 8 of 8. Systolic to diastolic ratios ranged from 2.0-2.2.     US RETROPERITONEAL COMPLETE    Result Date: 12/12/2021  EXAMINATION: RETROPERITONEAL ULTRASOUND OF THE KIDNEYS AND URINARY BLADDER 12/12/2021 COMPARISON: January 7, 2019 HISTORY: ORDERING SYSTEM PROVIDED HISTORY: urinary tract infection FINDINGS: Kidneys: The right kidney measures 10.9 x 6.3 x 5.9 cm and the left kidney measures 13.3 x 6.9 x 7.9 cm. Kidneys demonstrate normal cortical echogenicity. Moderate bilateral hydronephrosis noted. No evidence of intrarenal stones. Bladder: The partially distended bladder is grossly unremarkable. Moderate bilateral hydronephrosis. LABS  Recent Labs     12/11/21 2115 12/13/21  0825   WBC 15.0* 13.6*   HGB 9.8* 8.2*   HCT 30.7* 25.5*   MCV 85.5 85.6    321     Recent Labs     12/11/21 2115 12/11/21 2115 12/13/21  0825     --  136   K 4.4  --  3.5      < > 106   CO2 21*   < > 18*   BUN 10  --  7   CREATININE 1.0  --  0.8   GFRAA >60  --  >60   LABGLOM >60  --  >60   GLUCOSE 73*  --  77   PROT 7.6  --  5.6*   LABALBU 3.4*  --  2.4*   CALCIUM 8.7  --  7.8*   BILITOT <0.2  --  <0.2   ALKPHOS 184*  --  129*   AST 12  --  8   ALT 7  --  5    < > = values in this interval not displayed. No results for input(s): PROCAL in the last 72 hours.   Lab Results   Component Value Date    CRP 1.8 (H) 01/08/2019    CRP 7.3 (H) 01/01/2019     Lab Results   Component Value Date    SEDRATE 62 (H) 01/08/2019    SEDRATE 52 (H) 01/01/2019     Lab Results   Component Value Date    DNPCYPE6O9 see below 11/15/2021     Lab Results   Component Value Date    COVID19 Not Detected 12/11/2021     COVID-19/CONSTANCE-COV2 LABS  Recent Labs     12/11/21 2115 12/13/21  0825   AST 12 8   ALT 7 5     No results found for: CHOL, TRIG, HDL, LDLCALC, LABVLDL      Lab Results   Component Value Date    HCVABI Non-Reactive 11/15/2021     Hep C Ab Interp   Date Value Ref Range Status   11/15/2021 [facility] MICROBIOLOGY:       Lab Results   Component Value Date    BC 24 Hours no growth 12/11/2021    BC 5 Days- no growth 01/01/2019    ORG Escherichia coli 12/11/2021    ORG Escherichia coli 11/15/2021    ORG FILM ARR Rhinovirus/Enterovirus Detected  01/01/2019     Lab Results   Component Value Date    BLOODCULT2 24 Hours no growth 12/11/2021    BLOODCULT2 5 Days- no growth 01/01/2019    ORG Escherichia coli 12/11/2021    ORG Escherichia coli 11/15/2021    ORG FILM ARR Rhinovirus/Enterovirus Detected  01/01/2019       Body Fluid Culture, Sterile   Date Value Ref Range Status   01/08/2019 Growth not present  Final     No results found for: CXSURG  Urine Culture, Routine   Date Value Ref Range Status   12/11/2021 >100,000 CFU/ml  Final   11/15/2021 75,000 CFU/ml  Final       FINAL IMPRESSION    Patient is a 22 y.o. female who presented with   Chief Complaint   Patient presents with    Pregnancy Problem     35 weeks 3 days  pelvic pressure    and admitted for 35 weeks gestation of pregnancy [Z3A.35]  Pyelonephritis [N12]   sepsis  leukocytosis better  afebrile   complicated uti/E coli/hydronephosis? pyelonephritis  -cont atbx  Sx better  final cx  E coli can d/c with omnicef  Blood c xngtd        Imaging and labs were reviewed per medical records        An opportunity to ask questions was given to the patient/FAMILY and questions were answered. Thank you for involving me in the care of [facility]. Please do not hesitate to call for any questions or concerns.          Electronically signed by Everett Presley MD on 12/14/2021 at 8:38 AM

## 2021-12-15 VITALS
BODY MASS INDEX: 26.63 KG/M2 | RESPIRATION RATE: 16 BRPM | HEIGHT: 64 IN | HEART RATE: 78 BPM | WEIGHT: 156 LBS | DIASTOLIC BLOOD PRESSURE: 59 MMHG | OXYGEN SATURATION: 98 % | TEMPERATURE: 97.6 F | SYSTOLIC BLOOD PRESSURE: 118 MMHG

## 2021-12-15 LAB
C DIFF TOXIN/ANTIGEN: NORMAL
C. TRACHOMATIS DNA ,URINE: NEGATIVE
GROUP B STREP CULTURE: ABNORMAL
N. GONORRHOEAE DNA, URINE: NEGATIVE
ORGANISM: ABNORMAL
SOURCE: NORMAL

## 2021-12-15 PROCEDURE — 6370000000 HC RX 637 (ALT 250 FOR IP): Performed by: LEGAL MEDICINE

## 2021-12-15 PROCEDURE — 6370000000 HC RX 637 (ALT 250 FOR IP): Performed by: SPECIALIST

## 2021-12-15 RX ADMIN — CEFDINIR 300 MG: 300 CAPSULE ORAL at 11:15

## 2021-12-15 RX ADMIN — FERROUS SULFATE TAB 325 MG (65 MG ELEMENTAL FE) 325 MG: 325 (65 FE) TAB at 09:08

## 2021-12-15 NOTE — PROGRESS NOTES
Dr. Bryce Decker notified of loss of IV access, no new access needed as patient is tolerating a general diet. Also notified that patient recently stated she has been experiencing diarrhea since last evening. Dr. Bryce Decker to round on patient.

## 2021-12-15 NOTE — DISCHARGE SUMMARY
[address]                               DISCHARGE SUMMARY    PATIENT NAME: Lisa                      :        2000  MED REC NO:   [id number]                            ROOM:       [facility]04  ACCOUNT NO:   [de-identified]                           ADMIT DATE: 2021. PROVIDER:     Kristin Zamudio MD                  DISCHARGE DATE:  12/15/2021    ADDENDUM    The patient remained afebrile for 48 hours, including 24 hour on oral  antibiotics. Her diarrhea had decreased. She had no further complaints  and felt ready to go home. She had good fetal movement. No leaking  fluid or vaginal bleeding. No contractions. No nausea or vomiting. Her back pain had resolved. Her vital signs were stable. She was,  therefore, discharged home with fetal movement and  labor  precautions. Prescription for Omnicef 300 mg b.i.d. for 2 weeks was  ordered for her. She is to follow up at the office on Monday. She  indicated understanding of all instructions.         Bennett Giron MD    D: 12/15/2021 16:31:12       T: 12/15/2021 16:33:24     [email or web address]  Job#: [id number]     Doc#: [id number]    CC:

## 2021-12-15 NOTE — PROGRESS NOTES
[address] [facility]    [address] [place] [address]  [place], [address] [place] [address] Street  Phone [phone]   [phone]      Admit Date: 2021  6:38 PM  Pt Name: Lidya Walden  MRN: [id number]  : 2000  Reason for Consult:    Chief Complaint   Patient presents with    Pregnancy Problem     35 weeks 3 days  pelvic pressure     Requesting Physician:  Jaylin Rodriguez MD  PCP: Jyoti Lechuga MD  History Obtained From:  patient, chart   ID consulted for 35 weeks gestation of pregnancy [Z3A.35]  Pyelonephritis [N12]  on hospital day [address]       Chief Complaint   Patient presents with    Pregnancy Problem     35 weeks 3 days  pelvic pressure     HISTORYOF PRESENT ILLNESS   Lidya Walden is a 22 y.o. female who presents with   has no past medical history on file.    HPI  Pt came in due uti/back pain  She was admitted on - for uti E coli and left AMA she received unasyn then  Pt comes in uti sx and pressure pt had fevers [id number]  She denies /n/v/d  cr1 wbc15 hgb9.8 liv065 ua blood nitrite le >wbc bacteria le   +cannabis  US b hydro  Previous urien cx E coli  Current meds   ampicillin-sulbactam (UNASYN) 3000 mg ivpb minibag, Q6H  cefTRIAXone (ROCEPHIN) 2,000 mg in sodium chloride (PF) 20 mL IV syringe, Q24H    She is currently comfortable eating lunch   she has no questions or concerns    DOS  12/15/21   Awake and alert in bed has no c/o f/c/n/v/d/yeast infection no uti sx   Afebrile on RA       REVIEW OF SYSTEMS     CONSTITUTIONAL:   No fever, chills, weight loss  ALLERGIES:    No urticaria, hay fever,    EYES:     No blurry vision, loss of vision, eye pain  ENT:      No hearing loss, sore throat  CARDIOVASCULAR:  No chest pain or palpitations  RESPIRATORY:   No cough, sob  ENDOCRINE:    No increase thirst, urination   HEME-LYMPH:   No easy bruising or bleeding  GI:     No nausea, vomiting or diarrhea  :     barbara urinary complaints  NEURO:    No seizures, stroke, HA  MUSCULOSKELETAL:  No muscle aches or pain, no joint pain  SKIN:     No rash or itch  PSYCH:    No depression or anxiety    Medications Prior to Admission: Prenatal MV-Min-Fe Fum-FA-DHA (PRENATAL 1 PO), Take by mouth  CURRENT MEDICATIONS     Current Facility-Administered Medications:     cefdinir (OMNICEF) capsule 300 mg, 300 mg, Oral, 2 times per day, Dionte Casarez MD, 300 mg at 21 2337    ferrous sulfate (IRON 325) tablet 325 mg, 325 mg, Oral, BID WC, Martin Valles MD, 325 mg at 21 1730    vitamin C tablet 250 mg, 250 mg, Oral, BID, Martin Valles MD, 250 mg at 21 1730    sodium chloride flush 0.9 % injection 5-40 mL, 5-40 mL, IntraVENous, BID, Martin Valles MD, 10 mL at 21 2142    sodium chloride flush 0.9 % injection 5-40 mL, 5-40 mL, IntraVENous, PRN, Martin Valles MD, 10 mL at 21 2135    lactated ringers infusion, , IntraVENous, Continuous, Martin Valles MD, Stopped at 21 1822    acetaminophen (TYLENOL) tablet 650 mg, 650 mg, Oral, Q4H PRN, Martin Valles MD, 650 mg at 21 0100    ondansetron (ZOFRAN) injection 4 mg, 4 mg, IntraVENous, Q6H PRN, Martin Valles MD, 4 mg at 21 2343  ALLERGIES     Seasonal  Immunization History   Administered Date(s) Administered    Influenza, Quadv, 6 mo and older, IM, PF (Flulaval, Fluarix) 2019    MMR 2019    Tdap (Boostrix, Adacel) 2019      Internal Administration   First Dose      Second Dose           Last COVID Lab SARS-CoV-2, NAAT (no units)   Date Value   2021 Not Detected            PAST MEDICAL HISTORY   No past medical history on file.   SURGICAL HISTORY       Past Surgical History:   Procedure Laterality Date    CERVICAL CERCLAGE       SECTION N/A 2019     SECTION performed [facility] L[facility] L[facility]D    IN OFFICE/OUTPT VISIT,PROCEDURE ONLY N/A 2018    CERVIX CERCLAGE performed by Martin Valles MD at [address] [place] [address] Vitals:    12/14/21 1117 12/14/21 1521 12/14/21 1924 12/14/21 2335   BP: 102/64 109/75 (!) 98/54 116/61   Pulse: 65 82 85 71   Resp: 14 16 15 16   Temp: 97.7 °F (36.5 °C) 97.6 °F (36.4 °C) 97.8 °F (36.6 °C) 97.6 °F (36.4 °C)   TempSrc: Oral Oral Oral Axillary   SpO2:  96%  100%   Weight:       Height:         Physical Exam   Constitutional/General: Alert and oriented, NAD in bed  Head: NC/AT  Eyes: PERRL, EOMI   Pulmonary: Lungs clear to auscultation bilaterally noweeze  Cardiovascular:  Regular rate and rhythm,   Abdomen: Soft, + BS.   distension. Nontender. Extremities: Moves all extremities x 4. Skin: Warm and dry    Neurologic:    No focal deficits  Psych: Normal Affect     DIAGNOSTIC RESULTS   RADIOLOGY:   US OB 14 PLUS WEEKS SINGLE OR FIRST GESTATION    Result Date: 12/11/2021  EXAMINATION: TRANSABDOMINAL SECOND/THIRD TRIMESTER OBSTETRIC PELVIC ULTRASOUND WITH COLOR DOPPLER FLOW; BIOPHYSICAL PROFILE WITHOUT NON-STRESS TEST 12/11/2021 TECHNIQUE: TRANSABDOMINAL PELVIC ULTRASOUND WITH COLOR DOPPLER FLOW; ULTRASOUND BIOPHYSICAL PROFILE WITHOUT NON-STRESS TEST COMPARISON: None available. HISTORY: ORDERING SYSTEM PROVIDED HISTORY: limited prenatal visits TECHNOLOGIST PROVIDED HISTORY: Reason for exam:->limited prenatal visits What reading provider will be dictating this exam?->CRC; ORDERING SYSTEM PROVIDED HISTORY: fetal wellbeing TECHNOLOGIST PROVIDED HISTORY: With MARIAELENA please Reason for exam:->fetal wellbeing What reading provider will be dictating this exam?->CRC FINDINGS: A single live intrauterine pregnancy is present. Fetal heart rate measures 150 beats per minute. There is normal fetal body and limb movement. The fetus is in cephalic position. The placenta is located left lateral. The amniotic fluid volume is visually normal and the amniotic fluid index measures 13.73 cm. The cervix is not well seen secondary to shadowing from the fetal calvarium.  Systolic to diastolic ratios were 2.0, 2.2, and 2.0 With MARIAELENA please Reason for exam:->fetal wellbeing What reading provider will be dictating this exam?->CRC FINDINGS: A single live intrauterine pregnancy is present. Fetal heart rate measures 150 beats per minute. There is normal fetal body and limb movement. The fetus is in cephalic position. The placenta is located left lateral. The amniotic fluid volume is visually normal and the amniotic fluid index measures 13.73 cm. The cervix is not well seen secondary to shadowing from the fetal calvarium. Systolic to diastolic ratios were 2.0, 2.2, and 2.0 (normal for gestational age). BPD measures 8.73 cm. 35 weeks and 2 days. Head circumference measures 31.23 cm. 35 weeks and 0 days. Abdominal circumference measures 30.51 cm. 34 weeks and 3 days. Femur length measures 6.75 cm. 34 weeks and 5 days. Estimated fetal weight is 2483 grams which correlates to 27th percentile based upon last menstrual period. BIOPHYSICAL PROFILE: Fetal Tone:  2/2 Gross Body:  2/2 Breathin/2 Qualitative Fluid:  2/2 Biophysical Profile Score:  8/8 Estimated gestational age by current ultrasound is 34 weeks and 6 days Estimated date of delivery based on current ultrasound: 2022 Clinical age provided: 27 weeks and 3 days Estimated date of delivery based on clinical age provided: 2022.     1.  Single live intrauterine pregnancy with gestational age of 29 weeks and 6 days by current sonographic biometry which is concordant with the gestational age provided of 27 weeks and 3 days. The estimated due date based on current ultrasound is 9780. 2.  Cephalic presentation with no evidence of previa. Fetal heart rate was 150 beats per minute. Visually normal amniotic fluid. Estimated fetal weight was 2483 g. 3.  Normal biophysical profile, 8 of 8.   Systolic to diastolic ratios ranged from 2.0-2.2.     US RETROPERITONEAL COMPLETE    Result Date: 2021  EXAMINATION: RETROPERITONEAL ULTRASOUND OF THE KIDNEYS AND URINARY BLADDER 12/12/2021 COMPARISON: January 7, 2019 HISTORY: ORDERING SYSTEM PROVIDED HISTORY: urinary tract infection FINDINGS: Kidneys: The right kidney measures 10.9 x 6.3 x 5.9 cm and the left kidney measures 13.3 x 6.9 x 7.9 cm. Kidneys demonstrate normal cortical echogenicity. Moderate bilateral hydronephrosis noted. No evidence of intrarenal stones. Bladder: The partially distended bladder is grossly unremarkable. Moderate bilateral hydronephrosis. LABS  Recent Labs     12/13/21  0825   WBC 13.6*   HGB 8.2*   HCT 25.5*   MCV 85.6        Recent Labs     12/13/21  0825      K 3.5      CO2 18*   BUN 7   CREATININE 0.8   GFRAA >60   LABGLOM >60   GLUCOSE 77   PROT 5.6*   LABALBU 2.4*   CALCIUM 7.8*   BILITOT <0.2   ALKPHOS 129*   AST 8   ALT 5     No results for input(s): PROCAL in the last 72 hours.   Lab Results   Component Value Date    CRP 1.8 (H) 01/08/2019    CRP 7.3 (H) 01/01/2019     Lab Results   Component Value Date    SEDRATE 62 (H) 01/08/2019    SEDRATE 52 (H) 01/01/2019     Lab Results   Component Value Date    GMSBFCL3C4 see below 11/15/2021     Lab Results   Component Value Date    COVID19 Not Detected 12/11/2021     COVID-19/CONSTANCE-COV2 LABS  Recent Labs     12/13/21  0825   AST 8   ALT 5     No results found for: CHOL, TRIG, HDL, LDLCALC, LABVLDL      Lab Results   Component Value Date    HCVABI Non-Reactive 11/15/2021     Hep C Ab Interp   Date Value Ref Range Status   11/15/2021 Non-Reactive Non-Reactive Final       MICROBIOLOGY:       Lab Results   Component Value Date    BC 24 Hours no growth 12/11/2021    BC 5 Days- no growth 01/01/2019    ORG Escherichia coli 12/11/2021    ORG Escherichia coli 11/15/2021    ORG FILM ARR Rhinovirus/Enterovirus Detected  01/01/2019     Lab Results   Component Value Date    BLOODCULT2 24 Hours no growth 12/11/2021    BLOODCULT2 5 Days- no growth 01/01/2019    ORG Escherichia coli 12/11/2021    ORG Escherichia coli 11/15/2021 ORG FILM ARR Rhinovirus/Enterovirus Detected  01/01/2019       Body Fluid Culture, Sterile   Date Value Ref Range Status   01/08/2019 Growth not present  Final     No results found for: CXSURG  Urine Culture, Routine   Date Value Ref Range Status   12/11/2021 >100,000 CFU/ml  Final   11/15/2021 75,000 CFU/ml  Final       FINAL IMPRESSION    Patient is a 22 y.o. female who presented with   Chief Complaint   Patient presents with    Pregnancy Problem     35 weeks 3 days  pelvic pressure    and admitted for 35 weeks gestation of pregnancy [Z3A.35]  Pyelonephritis [N12]  sepsis  leukocytosis   Remains afebrile    complicated uti/E coli/hydronephosis? pyelonephritis  -cont atbx  Sx better   E coli can d/c with omnicef for 2 weeks  Pt med rec  Blood c xngtd        Imaging and labs were reviewed per medical records        An opportunity to ask questions was given to the patient/FAMILY and questions were answered. Thank you for involving me in the care of [facility]. Please do not hesitate to call for any questions or concerns.          Electronically signed by Francisco J Castro MD on 12/15/2021 at 8:50 AM

## 2021-12-15 NOTE — PROGRESS NOTES
Assumed care of patient, RN bedside, plan of care discussed. Patient educated on need for stool specimen for testing, agreeable. Patient denies discomfort at this time. Comfort measures provided. Call light within reach.

## 2021-12-15 NOTE — DISCHARGE SUMMARY
[address]                               DISCHARGE SUMMARY    PATIENT NAME: Dionisio Conroy                     :        2000  MED REC NO:   [id number]                            ROOM:       [facility]  ACCOUNT NO:   [de-identified]                           ADMIT DATE: 11/15/2021. PROVIDER:     Gayatri Vasquez MD                  DISCHARGE DATE:    ADMITTING DIAGNOSIS:  Intrauterine pregnancy at 35-1/2 weeks with back  pain. DISCHARGE DIAGNOSES:  Intrauterine pregnancy at 35-1/2 weeks with back  pain, E. coli pyelonephritis. HOSPITAL COURSE IN DETAIL:  The patient has now been afebrile for 36  hours. She did state that she has started to experience some watery  stools starting last night. No nausea or vomiting. Passing flatus. No  contractions. Has good fetal movement. Her back pain has resolved. Urine culture grew out E. coli. It is sensitive to cefazolin. She is  afebrile. Heart rate is 82-85. Blood pressure /75-54. O2 sat  96% on room air. Lungs:  Clear to auscultation. CV:  Regular rate and  rhythm. Abdomen:  Gravid, soft, nontender. Fetal heart tones are  present in the 140s with fftz-wi-ltnl variability present. Accelerations noted. No contractions. Extremities:  No clubbing,  cyanosis, edema. No cords or erythema. ASSESSMENT:  The patient intrauterine pregnancy at 35 weeks and 6 days  with pyelonephritis, responding to therapy. New onset diarrhea. PLAN:  Stool C. diff. She was switched over from IV antibiotics Omnicef  by Infectious Disease.   Further management based on clinical findings        Mayco Croft MD    D: 2021 20:07:40       T: 2021 20:10:06     [email or web address]  Job#: [id number]     Doc#: [id number]    CC:

## 2021-12-17 LAB
BLOOD CULTURE, ROUTINE: NORMAL
CULTURE, BLOOD 2: NORMAL

## 2021-12-26 ENCOUNTER — ANESTHESIA EVENT (OUTPATIENT)
Dept: LABOR AND DELIVERY | Age: 21
DRG: 540 | End: 2021-12-26
Payer: MEDICAID

## 2021-12-26 ENCOUNTER — HOSPITAL ENCOUNTER (INPATIENT)
Age: 21
LOS: 3 days | Discharge: HOME | DRG: 540 | End: 2021-12-29
Attending: LEGAL MEDICINE | Admitting: LEGAL MEDICINE
Payer: MEDICAID

## 2021-12-26 ENCOUNTER — ANESTHESIA (OUTPATIENT)
Dept: LABOR AND DELIVERY | Age: 21
DRG: 540 | End: 2021-12-26
Payer: MEDICAID

## 2021-12-26 VITALS
OXYGEN SATURATION: 98 % | RESPIRATION RATE: 15 BRPM | TEMPERATURE: 97.7 F | SYSTOLIC BLOOD PRESSURE: 123 MMHG | DIASTOLIC BLOOD PRESSURE: 65 MMHG

## 2021-12-26 PROBLEM — Z34.90 TERM PREGNANCY: Status: ACTIVE | Noted: 2021-12-26

## 2021-12-26 LAB
ABO/RH: NORMAL
ALBUMIN SERPL-MCNC: 3.6 G/DL (ref 3.5–5.2)
ALP BLD-CCNC: 191 U/L (ref 35–104)
ALT SERPL-CCNC: 8 U/L (ref 0–32)
AMNISURE, POC: POSITIVE
AMPHETAMINE SCREEN, URINE: NOT DETECTED
ANION GAP SERPL CALCULATED.3IONS-SCNC: 14 MMOL/L (ref 7–16)
ANTIBODY SCREEN: NORMAL
AST SERPL-CCNC: 15 U/L (ref 0–31)
BACTERIA: ABNORMAL /HPF
BARBITURATE SCREEN URINE: NOT DETECTED
BENZODIAZEPINE SCREEN, URINE: NOT DETECTED
BILIRUB SERPL-MCNC: 0.2 MG/DL (ref 0–1.2)
BILIRUBIN URINE: NEGATIVE
BLOOD, URINE: ABNORMAL
BUN BLDV-MCNC: 10 MG/DL (ref 6–20)
CALCIUM SERPL-MCNC: 8.5 MG/DL (ref 8.6–10.2)
CANNABINOID SCREEN URINE: POSITIVE
CHLORIDE BLD-SCNC: 101 MMOL/L (ref 98–107)
CLARITY: CLEAR
CO2: 18 MMOL/L (ref 22–29)
COCAINE METABOLITE SCREEN URINE: NOT DETECTED
COLOR: YELLOW
CREAT SERPL-MCNC: 0.7 MG/DL (ref 0.5–1)
EPITHELIAL CELLS, UA: ABNORMAL /HPF
FENTANYL SCREEN, URINE: NOT DETECTED
GFR AFRICAN AMERICAN: >60
GFR NON-AFRICAN AMERICAN: >60 ML/MIN/1.73
GLUCOSE BLD-MCNC: 69 MG/DL (ref 74–99)
GLUCOSE URINE: NEGATIVE MG/DL
HCT VFR BLD CALC: 29.2 % (ref 34–48)
HEMOGLOBIN: 9.2 G/DL (ref 11.5–15.5)
KETONES, URINE: NEGATIVE MG/DL
LEUKOCYTE ESTERASE, URINE: ABNORMAL
Lab: ABNORMAL
Lab: ABNORMAL
MCH RBC QN AUTO: 26.4 PG (ref 26–35)
MCHC RBC AUTO-ENTMCNC: 31.5 % (ref 32–34.5)
MCV RBC AUTO: 83.9 FL (ref 80–99.9)
METHADONE SCREEN, URINE: NOT DETECTED
NEGATIVE QC PASS/FAIL: ABNORMAL
NITRITE, URINE: NEGATIVE
OPIATE SCREEN URINE: NOT DETECTED
OXYCODONE URINE: NOT DETECTED
PDW BLD-RTO: 14.6 FL (ref 11.5–15)
PH UA: 7.5 (ref 5–9)
PHENCYCLIDINE SCREEN URINE: NOT DETECTED
PLATELET # BLD: 477 E9/L (ref 130–450)
PMV BLD AUTO: 10.6 FL (ref 7–12)
POSITIVE QC PASS/FAIL: ABNORMAL
POTASSIUM SERPL-SCNC: 4.3 MMOL/L (ref 3.5–5)
PROTEIN UA: NEGATIVE MG/DL
RBC # BLD: 3.48 E12/L (ref 3.5–5.5)
RBC UA: ABNORMAL /HPF (ref 0–2)
SARS-COV-2, NAAT: DETECTED
SODIUM BLD-SCNC: 133 MMOL/L (ref 132–146)
SPECIFIC GRAVITY UA: 1.01 (ref 1–1.03)
TOTAL PROTEIN: 7.6 G/DL (ref 6.4–8.3)
UROBILINOGEN, URINE: 0.2 E.U./DL
WBC # BLD: 14.2 E9/L (ref 4.5–11.5)
WBC UA: ABNORMAL /HPF (ref 0–5)

## 2021-12-26 PROCEDURE — 86900 BLOOD TYPING SEROLOGIC ABO: CPT

## 2021-12-26 PROCEDURE — 81001 URINALYSIS AUTO W/SCOPE: CPT

## 2021-12-26 PROCEDURE — G0480 DRUG TEST DEF 1-7 CLASSES: HCPCS

## 2021-12-26 PROCEDURE — 86850 RBC ANTIBODY SCREEN: CPT

## 2021-12-26 PROCEDURE — 3700000001 HC ADD 15 MINUTES (ANESTHESIA): Performed by: LEGAL MEDICINE

## 2021-12-26 PROCEDURE — 7100000000 HC PACU RECOVERY - FIRST 15 MIN: Performed by: LEGAL MEDICINE

## 2021-12-26 PROCEDURE — 2580000003 HC RX 258: Performed by: LEGAL MEDICINE

## 2021-12-26 PROCEDURE — 84112 EVAL AMNIOTIC FLUID PROTEIN: CPT

## 2021-12-26 PROCEDURE — 88307 TISSUE EXAM BY PATHOLOGIST: CPT

## 2021-12-26 PROCEDURE — 80307 DRUG TEST PRSMV CHEM ANLYZR: CPT

## 2021-12-26 PROCEDURE — 36415 COLL VENOUS BLD VENIPUNCTURE: CPT

## 2021-12-26 PROCEDURE — 87635 SARS-COV-2 COVID-19 AMP PRB: CPT

## 2021-12-26 PROCEDURE — 7100000001 HC PACU RECOVERY - ADDTL 15 MIN: Performed by: LEGAL MEDICINE

## 2021-12-26 PROCEDURE — 86901 BLOOD TYPING SEROLOGIC RH(D): CPT

## 2021-12-26 PROCEDURE — 6360000002 HC RX W HCPCS: Performed by: NURSE ANESTHETIST, CERTIFIED REGISTERED

## 2021-12-26 PROCEDURE — 6360000002 HC RX W HCPCS: Performed by: LEGAL MEDICINE

## 2021-12-26 PROCEDURE — U0005 INFEC AGEN DETEC AMPLI PROBE: HCPCS

## 2021-12-26 PROCEDURE — 6370000000 HC RX 637 (ALT 250 FOR IP): Performed by: LEGAL MEDICINE

## 2021-12-26 PROCEDURE — 59514 CESAREAN DELIVERY ONLY: CPT | Performed by: MIDWIFE

## 2021-12-26 PROCEDURE — 80053 COMPREHEN METABOLIC PANEL: CPT

## 2021-12-26 PROCEDURE — U0003 INFECTIOUS AGENT DETECTION BY NUCLEIC ACID (DNA OR RNA); SEVERE ACUTE RESPIRATORY SYNDROME CORONAVIRUS 2 (SARS-COV-2) (CORONAVIRUS DISEASE [COVID-19]), AMPLIFIED PROBE TECHNIQUE, MAKING USE OF HIGH THROUGHPUT TECHNOLOGIES AS DESCRIBED BY CMS-2020-01-R: HCPCS

## 2021-12-26 PROCEDURE — 2500000003 HC RX 250 WO HCPCS: Performed by: LEGAL MEDICINE

## 2021-12-26 PROCEDURE — 1220000001 HC SEMI PRIVATE L&D R&B

## 2021-12-26 PROCEDURE — 2709999900 HC NON-CHARGEABLE SUPPLY: Performed by: LEGAL MEDICINE

## 2021-12-26 PROCEDURE — 87088 URINE BACTERIA CULTURE: CPT

## 2021-12-26 PROCEDURE — 2580000003 HC RX 258: Performed by: NURSE ANESTHETIST, CERTIFIED REGISTERED

## 2021-12-26 PROCEDURE — 3609079900 HC CESAREAN SECTION: Performed by: LEGAL MEDICINE

## 2021-12-26 PROCEDURE — 2500000003 HC RX 250 WO HCPCS: Performed by: NURSE ANESTHETIST, CERTIFIED REGISTERED

## 2021-12-26 PROCEDURE — 85027 COMPLETE CBC AUTOMATED: CPT

## 2021-12-26 PROCEDURE — 3700000000 HC ANESTHESIA ATTENDED CARE: Performed by: LEGAL MEDICINE

## 2021-12-26 RX ORDER — TRISODIUM CITRATE DIHYDRATE AND CITRIC ACID MONOHYDRATE 500; 334 MG/5ML; MG/5ML
30 SOLUTION ORAL ONCE
Status: COMPLETED | OUTPATIENT
Start: 2021-12-26 | End: 2021-12-26

## 2021-12-26 RX ORDER — SODIUM CHLORIDE, SODIUM LACTATE, POTASSIUM CHLORIDE, AND CALCIUM CHLORIDE .6; .31; .03; .02 G/100ML; G/100ML; G/100ML; G/100ML
1000 INJECTION, SOLUTION INTRAVENOUS ONCE
Status: DISCONTINUED | OUTPATIENT
Start: 2021-12-26 | End: 2021-12-26

## 2021-12-26 RX ORDER — CARBOPROST TROMETHAMINE 250 UG/ML
250 INJECTION, SOLUTION INTRAMUSCULAR PRN
Status: DISCONTINUED | OUTPATIENT
Start: 2021-12-26 | End: 2021-12-29 | Stop reason: HOSPADM

## 2021-12-26 RX ORDER — SODIUM CHLORIDE 9 MG/ML
25 INJECTION, SOLUTION INTRAVENOUS PRN
Status: DISCONTINUED | OUTPATIENT
Start: 2021-12-26 | End: 2021-12-26

## 2021-12-26 RX ORDER — ONDANSETRON 2 MG/ML
4 INJECTION INTRAMUSCULAR; INTRAVENOUS EVERY 6 HOURS PRN
Status: DISCONTINUED | OUTPATIENT
Start: 2021-12-26 | End: 2021-12-29 | Stop reason: HOSPADM

## 2021-12-26 RX ORDER — SODIUM CHLORIDE 0.9 % (FLUSH) 0.9 %
10 SYRINGE (ML) INJECTION EVERY 12 HOURS SCHEDULED
Status: DISCONTINUED | OUTPATIENT
Start: 2021-12-26 | End: 2021-12-26

## 2021-12-26 RX ORDER — OXYCODONE HYDROCHLORIDE 5 MG/1
10 TABLET ORAL EVERY 4 HOURS PRN
Status: DISCONTINUED | OUTPATIENT
Start: 2021-12-26 | End: 2021-12-29 | Stop reason: HOSPADM

## 2021-12-26 RX ORDER — PHENYLEPHRINE HYDROCHLORIDE 10 MG/ML
INJECTION INTRAVENOUS PRN
Status: DISCONTINUED | OUTPATIENT
Start: 2021-12-26 | End: 2021-12-26 | Stop reason: SDUPTHER

## 2021-12-26 RX ORDER — MODIFIED LANOLIN
OINTMENT (GRAM) TOPICAL
Status: DISCONTINUED | OUTPATIENT
Start: 2021-12-26 | End: 2021-12-29 | Stop reason: HOSPADM

## 2021-12-26 RX ORDER — NALOXONE HYDROCHLORIDE 0.4 MG/ML
0.4 INJECTION, SOLUTION INTRAMUSCULAR; INTRAVENOUS; SUBCUTANEOUS PRN
Status: DISCONTINUED | OUTPATIENT
Start: 2021-12-26 | End: 2021-12-29 | Stop reason: HOSPADM

## 2021-12-26 RX ORDER — MORPHINE SULFATE 0.5 MG/ML
INJECTION, SOLUTION EPIDURAL; INTRATHECAL; INTRAVENOUS PRN
Status: DISCONTINUED | OUTPATIENT
Start: 2021-12-26 | End: 2021-12-26 | Stop reason: SDUPTHER

## 2021-12-26 RX ORDER — ACETAMINOPHEN 500 MG
1000 TABLET ORAL EVERY 6 HOURS
Status: DISCONTINUED | OUTPATIENT
Start: 2021-12-26 | End: 2021-12-29 | Stop reason: HOSPADM

## 2021-12-26 RX ORDER — SODIUM CHLORIDE 0.9 % (FLUSH) 0.9 %
10 SYRINGE (ML) INJECTION PRN
Status: DISCONTINUED | OUTPATIENT
Start: 2021-12-26 | End: 2021-12-29 | Stop reason: HOSPADM

## 2021-12-26 RX ORDER — DEXAMETHASONE SODIUM PHOSPHATE 10 MG/ML
INJECTION, SOLUTION INTRAMUSCULAR; INTRAVENOUS PRN
Status: DISCONTINUED | OUTPATIENT
Start: 2021-12-26 | End: 2021-12-26 | Stop reason: SDUPTHER

## 2021-12-26 RX ORDER — METHYLERGONOVINE MALEATE 0.2 MG/ML
INJECTION INTRAVENOUS PRN
Status: DISCONTINUED | OUTPATIENT
Start: 2021-12-26 | End: 2021-12-26

## 2021-12-26 RX ORDER — NALBUPHINE HCL 10 MG/ML
5 AMPUL (ML) INJECTION EVERY 4 HOURS PRN
Status: DISCONTINUED | OUTPATIENT
Start: 2021-12-26 | End: 2021-12-26

## 2021-12-26 RX ORDER — SODIUM CHLORIDE, SODIUM LACTATE, POTASSIUM CHLORIDE, CALCIUM CHLORIDE 600; 310; 30; 20 MG/100ML; MG/100ML; MG/100ML; MG/100ML
INJECTION, SOLUTION INTRAVENOUS CONTINUOUS
Status: DISCONTINUED | OUTPATIENT
Start: 2021-12-26 | End: 2021-12-26

## 2021-12-26 RX ORDER — OXYCODONE HYDROCHLORIDE 5 MG/1
5 TABLET ORAL EVERY 4 HOURS PRN
Status: DISCONTINUED | OUTPATIENT
Start: 2021-12-26 | End: 2021-12-29 | Stop reason: HOSPADM

## 2021-12-26 RX ORDER — SODIUM CHLORIDE 0.9 % (FLUSH) 0.9 %
10 SYRINGE (ML) INJECTION PRN
Status: DISCONTINUED | OUTPATIENT
Start: 2021-12-26 | End: 2021-12-26

## 2021-12-26 RX ORDER — PRENATAL WITH FERROUS FUM AND FOLIC ACID 3080; 920; 120; 400; 22; 1.84; 3; 20; 10; 1; 12; 200; 27; 25; 2 [IU]/1; [IU]/1; MG/1; [IU]/1; MG/1; MG/1; MG/1; MG/1; MG/1; MG/1; UG/1; MG/1; MG/1; MG/1; MG/1
1 TABLET ORAL DAILY
Status: DISCONTINUED | OUTPATIENT
Start: 2021-12-26 | End: 2021-12-29 | Stop reason: HOSPADM

## 2021-12-26 RX ORDER — ONDANSETRON 2 MG/ML
4 INJECTION INTRAMUSCULAR; INTRAVENOUS EVERY 6 HOURS PRN
Status: DISCONTINUED | OUTPATIENT
Start: 2021-12-26 | End: 2021-12-26

## 2021-12-26 RX ORDER — SODIUM CHLORIDE, SODIUM LACTATE, POTASSIUM CHLORIDE, CALCIUM CHLORIDE 600; 310; 30; 20 MG/100ML; MG/100ML; MG/100ML; MG/100ML
INJECTION, SOLUTION INTRAVENOUS CONTINUOUS PRN
Status: DISCONTINUED | OUTPATIENT
Start: 2021-12-26 | End: 2021-12-26 | Stop reason: SDUPTHER

## 2021-12-26 RX ORDER — NALOXONE HYDROCHLORIDE 0.4 MG/ML
0.4 INJECTION, SOLUTION INTRAMUSCULAR; INTRAVENOUS; SUBCUTANEOUS PRN
Status: DISCONTINUED | OUTPATIENT
Start: 2021-12-26 | End: 2021-12-26

## 2021-12-26 RX ORDER — SODIUM CHLORIDE 0.9 % (FLUSH) 0.9 %
10 SYRINGE (ML) INJECTION EVERY 12 HOURS SCHEDULED
Status: DISCONTINUED | OUTPATIENT
Start: 2021-12-26 | End: 2021-12-29 | Stop reason: HOSPADM

## 2021-12-26 RX ORDER — MEPERIDINE HYDROCHLORIDE 25 MG/ML
12.5 INJECTION INTRAMUSCULAR; INTRAVENOUS; SUBCUTANEOUS EVERY 6 HOURS PRN
Status: DISCONTINUED | OUTPATIENT
Start: 2021-12-26 | End: 2021-12-26

## 2021-12-26 RX ORDER — ACETAMINOPHEN 500 MG
1000 TABLET ORAL EVERY 6 HOURS PRN
Status: DISCONTINUED | OUTPATIENT
Start: 2021-12-26 | End: 2021-12-26

## 2021-12-26 RX ORDER — ONDANSETRON 2 MG/ML
INJECTION INTRAMUSCULAR; INTRAVENOUS PRN
Status: DISCONTINUED | OUTPATIENT
Start: 2021-12-26 | End: 2021-12-26

## 2021-12-26 RX ORDER — DIPHENHYDRAMINE HYDROCHLORIDE 50 MG/ML
INJECTION INTRAMUSCULAR; INTRAVENOUS PRN
Status: DISCONTINUED | OUTPATIENT
Start: 2021-12-26 | End: 2021-12-26

## 2021-12-26 RX ORDER — DIPHENHYDRAMINE HYDROCHLORIDE 50 MG/ML
12.5 INJECTION INTRAMUSCULAR; INTRAVENOUS EVERY 6 HOURS PRN
Status: DISCONTINUED | OUTPATIENT
Start: 2021-12-26 | End: 2021-12-26

## 2021-12-26 RX ORDER — FERROUS SULFATE 325(65) MG
325 TABLET ORAL
Status: DISCONTINUED | OUTPATIENT
Start: 2021-12-26 | End: 2021-12-29 | Stop reason: HOSPADM

## 2021-12-26 RX ORDER — METHYLERGONOVINE MALEATE 0.2 MG/ML
200 INJECTION INTRAVENOUS PRN
Status: DISCONTINUED | OUTPATIENT
Start: 2021-12-26 | End: 2021-12-29 | Stop reason: HOSPADM

## 2021-12-26 RX ORDER — OXYCODONE HYDROCHLORIDE 5 MG/1
5 TABLET ORAL EVERY 4 HOURS PRN
Status: DISCONTINUED | OUTPATIENT
Start: 2021-12-26 | End: 2021-12-26

## 2021-12-26 RX ORDER — DIPHENHYDRAMINE HYDROCHLORIDE 50 MG/ML
25 INJECTION INTRAMUSCULAR; INTRAVENOUS EVERY 6 HOURS PRN
Status: DISCONTINUED | OUTPATIENT
Start: 2021-12-26 | End: 2021-12-29 | Stop reason: HOSPADM

## 2021-12-26 RX ORDER — METOCLOPRAMIDE 10 MG/1
10 TABLET ORAL
Status: DISCONTINUED | OUTPATIENT
Start: 2021-12-27 | End: 2021-12-29 | Stop reason: HOSPADM

## 2021-12-26 RX ORDER — SODIUM CHLORIDE, SODIUM LACTATE, POTASSIUM CHLORIDE, CALCIUM CHLORIDE 600; 310; 30; 20 MG/100ML; MG/100ML; MG/100ML; MG/100ML
INJECTION, SOLUTION INTRAVENOUS CONTINUOUS
Status: DISCONTINUED | OUTPATIENT
Start: 2021-12-26 | End: 2021-12-29 | Stop reason: HOSPADM

## 2021-12-26 RX ORDER — SODIUM CHLORIDE 9 MG/ML
25 INJECTION, SOLUTION INTRAVENOUS PRN
Status: DISCONTINUED | OUTPATIENT
Start: 2021-12-26 | End: 2021-12-29 | Stop reason: HOSPADM

## 2021-12-26 RX ORDER — DOCUSATE SODIUM 100 MG/1
100 CAPSULE, LIQUID FILLED ORAL 2 TIMES DAILY
Status: DISCONTINUED | OUTPATIENT
Start: 2021-12-26 | End: 2021-12-29 | Stop reason: HOSPADM

## 2021-12-26 RX ORDER — OXYCODONE HYDROCHLORIDE 5 MG/1
10 TABLET ORAL EVERY 4 HOURS PRN
Status: DISCONTINUED | OUTPATIENT
Start: 2021-12-26 | End: 2021-12-26

## 2021-12-26 RX ORDER — BUPIVACAINE HYDROCHLORIDE 7.5 MG/ML
INJECTION, SOLUTION INTRASPINAL PRN
Status: DISCONTINUED | OUTPATIENT
Start: 2021-12-26 | End: 2021-12-26 | Stop reason: SDUPTHER

## 2021-12-26 RX ORDER — METOCLOPRAMIDE HYDROCHLORIDE 5 MG/ML
10 INJECTION INTRAMUSCULAR; INTRAVENOUS ONCE
Status: COMPLETED | OUTPATIENT
Start: 2021-12-26 | End: 2021-12-26

## 2021-12-26 RX ADMIN — MORPHINE SULFATE 0.15 MG: 0.5 INJECTION, SOLUTION EPIDURAL; INTRATHECAL; INTRAVENOUS at 16:50

## 2021-12-26 RX ADMIN — METHYLERGONOVINE MALEATE 200 MCG: 0.2 INJECTION, SOLUTION INTRAMUSCULAR; INTRAVENOUS at 17:05

## 2021-12-26 RX ADMIN — DEXAMETHASONE SODIUM PHOSPHATE 10 MG: 10 INJECTION, SOLUTION INTRAMUSCULAR; INTRAVENOUS at 17:05

## 2021-12-26 RX ADMIN — FAMOTIDINE 20 MG: 10 INJECTION, SOLUTION INTRAVENOUS at 16:09

## 2021-12-26 RX ADMIN — SODIUM CITRATE AND CITRIC ACID MONOHYDRATE 30 ML: 500; 334 SOLUTION ORAL at 16:10

## 2021-12-26 RX ADMIN — Medication 999 ML/HR: at 17:05

## 2021-12-26 RX ADMIN — SODIUM CHLORIDE 3000 MG: 900 INJECTION INTRAVENOUS at 16:40

## 2021-12-26 RX ADMIN — Medication 50 MILLI-UNITS/MIN: at 19:25

## 2021-12-26 RX ADMIN — MORPHINE SULFATE 4.85 MG: 0.5 INJECTION, SOLUTION EPIDURAL; INTRATHECAL; INTRAVENOUS at 17:13

## 2021-12-26 RX ADMIN — ONDANSETRON 4 MG: 2 INJECTION INTRAMUSCULAR; INTRAVENOUS at 17:05

## 2021-12-26 RX ADMIN — SODIUM CHLORIDE, POTASSIUM CHLORIDE, SODIUM LACTATE AND CALCIUM CHLORIDE: 600; 310; 30; 20 INJECTION, SOLUTION INTRAVENOUS at 19:25

## 2021-12-26 RX ADMIN — PHENYLEPHRINE HYDROCHLORIDE 100 MCG: 10 INJECTION INTRAVENOUS at 16:53

## 2021-12-26 RX ADMIN — SODIUM CHLORIDE, POTASSIUM CHLORIDE, SODIUM LACTATE AND CALCIUM CHLORIDE: 600; 310; 30; 20 INJECTION, SOLUTION INTRAVENOUS at 16:36

## 2021-12-26 RX ADMIN — METOCLOPRAMIDE 10 MG: 5 INJECTION, SOLUTION INTRAMUSCULAR; INTRAVENOUS at 16:10

## 2021-12-26 RX ADMIN — BUPIVACAINE HYDROCHLORIDE IN DEXTROSE 1.8 ML: 7.5 INJECTION, SOLUTION SUBARACHNOID at 16:50

## 2021-12-26 RX ADMIN — DIPHENHYDRAMINE HYDROCHLORIDE 50 MG: 50 INJECTION, SOLUTION INTRAMUSCULAR; INTRAVENOUS at 17:05

## 2021-12-26 RX ADMIN — ACETAMINOPHEN 1000 MG: 500 TABLET ORAL at 20:32

## 2021-12-26 ASSESSMENT — PULMONARY FUNCTION TESTS
PIF_VALUE: 0
PIF_VALUE: 1
PIF_VALUE: 0
PIF_VALUE: 1
PIF_VALUE: 0

## 2021-12-26 ASSESSMENT — PAIN SCALES - GENERAL: PAINLEVEL_OUTOF10: 0

## 2021-12-26 NOTE — ANESTHESIA PRE PROCEDURE
Department of Anesthesiology  Preprocedure Note       Name:  Joaquim Islas   Age:  22 y.o.  :  2000                                          MRN:  [id number]         Date:  2021      Surgeon: Chhaya Erickson):  Jesica Song MD    Procedure:  SECTION (N/A Abdomen)    Medications prior to admission:   Prior to Admission medications    Medication Sig Start Date End Date Taking? Authorizing Provider   cefdinir (OMNICEF) 300 MG capsule Take 1 capsule by mouth every 12 hours for 14 days 21  Jose Murillo MD   Prenatal MV-Min-Fe Fum-FA-DHA (PRENATAL 1 PO) Take by mouth    Historical Provider, MD       Current medications:    No current facility-administered medications for this visit. No current outpatient medications on file.      Facility-Administered Medications Ordered in Other Visits   Medication Dose Route Frequency Provider Last Rate Last Admin    lactated ringers infusion   IntraVENous Continuous Jesica Song MD        lactated ringers bolus  1,000 mL IntraVENous Once Jesica Song MD        sodium chloride flush 0.9 % injection 10 mL  10 mL IntraVENous 2 times per day Jesica Song MD        sodium chloride flush 0.9 % injection 10 mL  10 mL IntraVENous PRN Jesica Song MD        0.9 % sodium chloride infusion  25 mL IntraVENous PRN Jesica Song MD        cefOXitin (MEFOXIN) 2000 mg in dextrose 5% 50 mL (mini-bag)  2,000 mg IntraVENous On Call to [address] [place] [address], MD        oxytocin (PITOCIN) 30 units in 500 mL infusion  87.3 kim-units/min IntraVENous Continuous PRN Jesica Song MD        And    oxytocin (PITOCIN) 30 units in 500 mL infusion  10 Units IntraVENous PRN Jesica Song MD        ondansetron (ZOFRAN) injection 4 mg  4 mg IntraVENous Q6H PRN Jesica Song MD        ampicillin-sulbactam (UNASYN) 3000 mg ivpb minibag  3,000 mg IntraVENous Once Jesica Song MD        lactated ringers infusion   IntraVENous Continuous PRN Laine Ch Never Used   Substance Use Topics    Alcohol use: No                                Ready to quit: Not Answered  Counseling given: Not Answered      Vital Signs (Current): There were no vitals filed for this visit. BP Readings from Last 3 Encounters:   12/26/21 124/84   12/15/21 (!) 118/59   11/16/21 (!) 111/56       NPO Status:                                                                                 BMI:   Wt Readings from Last 3 Encounters:   12/26/21 173 lb (78.5 kg)   12/11/21 156 lb (70.8 kg)   11/15/21 156 lb (70.8 kg)     There is no height or weight on file to calculate BMI.    CBC:   Lab Results   Component Value Date    WBC 14.2 12/26/2021    RBC 3.48 12/26/2021    HGB 9.2 12/26/2021    HCT 29.2 12/26/2021    MCV 83.9 12/26/2021    RDW 14.6 12/26/2021     12/26/2021       CMP:   Lab Results   Component Value Date     12/13/2021    K 3.5 12/13/2021    K 4.4 12/11/2021     12/13/2021    CO2 18 12/13/2021    BUN 7 12/13/2021    CREATININE 0.8 12/13/2021    GFRAA >60 12/13/2021    LABGLOM >60 12/13/2021    GLUCOSE 77 12/13/2021    PROT 5.6 12/13/2021    CALCIUM 7.8 12/13/2021    BILITOT <0.2 12/13/2021    ALKPHOS 129 12/13/2021    AST 8 12/13/2021    ALT 5 12/13/2021       POC Tests: No results for input(s): POCGLU, POCNA, POCK, POCCL, POCBUN, POCHEMO, POCHCT in the last 72 hours.     Coags:   Lab Results   Component Value Date    PROTIME 11.9 01/08/2019    INR 1.0 01/08/2019    APTT 33.0 01/08/2019       HCG (If Applicable): No results found for: PREGTESTUR, PREGSERUM, HCG, HCGQUANT     ABGs: No results found for: PHART, PO2ART, SUL2TFY, NCW8YUE, BEART, D3NCYZMK     Type & Screen (If Applicable):  No results found for: [facility]    Anesthesia Evaluation  Patient summary reviewed and Nursing notes reviewed no history of anesthetic complications:   Airway: Mallampati: II  TM distance: >3 FB   Neck ROM: full  Mouth opening: > = 3 FB Dental: normal exam         Pulmonary:Negative Pulmonary ROS and normal exam  breath sounds clear to auscultation                             Cardiovascular:Negative CV ROS            Rhythm: regular  Rate: normal                    Neuro/Psych:   Negative Neuro/Psych ROS              GI/Hepatic/Renal: Neg GI/Hepatic/Renal ROS            Endo/Other: Negative Endo/Other ROS                    Abdominal:         (-) obese       Vascular: negative vascular ROS. Other Findings:               Anesthesia Plan      spinal     ASA 2     (Covid positive on rapid test today-negative test two weeks ago-asymptomatic since last test two weeks ago-most likely a false positive test today-)        Anesthetic plan and risks discussed with patient. Plan discussed with CRNA and surgical team.            DOS STAFF ADDENDUM:    Pt seen and examined, chart reviewed (including anesthesia, drug and allergy history). Anesthetic plan, risks, benefits, alternatives, and personnel involved discussed with patient. Patient verbalized an understanding and agrees to proceed. Plan discussed with care team members and agreed upon.     Nichelle Russell MD  Staff Anesthesiologist  4:39 PM      Nichelle Russell MD   12/26/2021

## 2021-12-26 NOTE — PROGRESS NOTES
PreOp DX:  IUP at 37 4/7 week gestation Pregnancy, suspected partial abruptio, Previous  section    Previous C/Section      Post OP Dx:  37 4/7 Pregnancy delivered by , suspected partial abruptio                       Living male baby delivered      Procedure:  Surgery/ Assistance    Anesthesia: epidural      Under epidural anesthesia the abdomen was prepared and draped . With my  assistance Dr Jose Manuel Jimenez performed  a , opening the abdomen, incising the uterus and delivering a living male baby at 1, weighing 6 lbs 3 oz    Then the uterus and the abdomen were closed. Procedure was well tolerated.

## 2021-12-26 NOTE — PROGRESS NOTES
Patient arrived to unit c/o bleeding;leaking of fluid. Pateint oriented to room and edward light Monitors applied. Amnisure done.

## 2021-12-26 NOTE — ANESTHESIA PROCEDURE NOTES
Spinal Block    Patient location during procedure: OB  Start time: 12/26/2021 4:44 PM  End time: 12/26/2021 4:50 PM  Reason for block: primary anesthetic and at surgeon's request  Staffing  Performed: resident/CRNA   Anesthesiologist: Julio Perez MD  Resident/CRNA: ARACELIS Hassan CRNA  Preanesthetic Checklist  Completed: patient identified, IV checked, site marked, risks and benefits discussed, surgical consent, monitors and equipment checked, pre-op evaluation, timeout performed, anesthesia consent given, oxygen available and patient being monitored  Spinal Block  Patient position: sitting  Prep: Betadine and site prepped and draped  Patient monitoring: cardiac monitor, continuous pulse ox, continuous capnometry and frequent blood pressure checks  Approach: midline  Location: L3/L4  Guidance: paresthesia technique  Provider prep: mask and sterile gloves  Local infiltration: lidocaine  Dose: 0.3  Agent: bupivacaine  Adjuvant: duramorph  Dose: 1.8  Dose: 1.8  Needle  Needle type: Pencan   Needle gauge: 25 G  Needle length: 3.5 in  Assessment  Sensory level: T6  Swirl obtained: Yes  CSF: clear  Attempts: 1  Hemodynamics: stable

## 2021-12-27 LAB
HEMOGLOBIN: 8.1 G/DL (ref 11.5–15.5)
INTEGRITY CHECK, CREATININE, URINE: 69.5
INTEGRITY CHECK, OXIDANT, URINE: <40
INTEGRITY CHECK, PH, URINE: 7.1 (ref 4.5–9)
INTEGRITY CHECK, SPECIFIC GRAVITY, URINE: 1.01 (ref 1–1.03)
INTEGRITY CHECK, SPECIMEN INTEGRITY, URINE: NORMAL
SARS-COV-2, PCR: DETECTED

## 2021-12-27 PROCEDURE — 36415 COLL VENOUS BLD VENIPUNCTURE: CPT

## 2021-12-27 PROCEDURE — 1220000001 HC SEMI PRIVATE L&D R&B

## 2021-12-27 PROCEDURE — 85018 HEMOGLOBIN: CPT

## 2021-12-27 PROCEDURE — 6370000000 HC RX 637 (ALT 250 FOR IP): Performed by: LEGAL MEDICINE

## 2021-12-27 RX ADMIN — OXYCODONE 10 MG: 5 TABLET ORAL at 17:21

## 2021-12-27 RX ADMIN — FERROUS SULFATE TAB 325 MG (65 MG ELEMENTAL FE) 325 MG: 325 (65 FE) TAB at 13:01

## 2021-12-27 RX ADMIN — METOCLOPRAMIDE 10 MG: 10 TABLET ORAL at 17:21

## 2021-12-27 RX ADMIN — DOCUSATE SODIUM 100 MG: 100 CAPSULE ORAL at 21:22

## 2021-12-27 RX ADMIN — DOCUSATE SODIUM 100 MG: 100 CAPSULE ORAL at 08:24

## 2021-12-27 RX ADMIN — METOCLOPRAMIDE 10 MG: 10 TABLET ORAL at 10:35

## 2021-12-27 RX ADMIN — METOCLOPRAMIDE 10 MG: 10 TABLET ORAL at 06:04

## 2021-12-27 RX ADMIN — PRENATAL WITH FERROUS FUM AND FOLIC ACID 1 TABLET: 3080; 920; 120; 400; 22; 1.84; 3; 20; 10; 1; 12; 200; 27; 25; 2 TABLET ORAL at 08:24

## 2021-12-27 RX ADMIN — FERROUS SULFATE TAB 325 MG (65 MG ELEMENTAL FE) 325 MG: 325 (65 FE) TAB at 08:24

## 2021-12-27 RX ADMIN — ACETAMINOPHEN 1000 MG: 500 TABLET ORAL at 21:22

## 2021-12-27 RX ADMIN — METOCLOPRAMIDE 10 MG: 10 TABLET ORAL at 21:22

## 2021-12-27 RX ADMIN — ACETAMINOPHEN 1000 MG: 500 TABLET ORAL at 02:30

## 2021-12-27 RX ADMIN — FERROUS SULFATE TAB 325 MG (65 MG ELEMENTAL FE) 325 MG: 325 (65 FE) TAB at 17:21

## 2021-12-27 RX ADMIN — ACETAMINOPHEN 1000 MG: 500 TABLET ORAL at 14:40

## 2021-12-27 RX ADMIN — ACETAMINOPHEN 1000 MG: 500 TABLET ORAL at 08:24

## 2021-12-27 RX ADMIN — OXYCODONE 10 MG: 5 TABLET ORAL at 10:34

## 2021-12-27 ASSESSMENT — PAIN SCALES - GENERAL
PAINLEVEL_OUTOF10: 1
PAINLEVEL_OUTOF10: 7
PAINLEVEL_OUTOF10: 5
PAINLEVEL_OUTOF10: 3
PAINLEVEL_OUTOF10: 5
PAINLEVEL_OUTOF10: 7

## 2021-12-27 ASSESSMENT — PAIN DESCRIPTION - ORIENTATION: ORIENTATION: MID;LOWER

## 2021-12-27 ASSESSMENT — PAIN - FUNCTIONAL ASSESSMENT: PAIN_FUNCTIONAL_ASSESSMENT: PREVENTS OR INTERFERES SOME ACTIVE ACTIVITIES AND ADLS

## 2021-12-27 ASSESSMENT — PAIN DESCRIPTION - PAIN TYPE: TYPE: SURGICAL PAIN

## 2021-12-27 ASSESSMENT — PAIN DESCRIPTION - DESCRIPTORS
DESCRIPTORS: CONSTANT;DISCOMFORT
DESCRIPTORS: BURNING;DISCOMFORT;SORE
DESCRIPTORS: BURNING;DISCOMFORT;SORE
DESCRIPTORS: BURNING;DISCOMFORT;SHARP

## 2021-12-27 ASSESSMENT — PAIN DESCRIPTION - LOCATION: LOCATION: INCISION

## 2021-12-27 ASSESSMENT — PAIN DESCRIPTION - ONSET: ONSET: ON-GOING

## 2021-12-27 ASSESSMENT — PAIN DESCRIPTION - FREQUENCY: FREQUENCY: INTERMITTENT

## 2021-12-27 ASSESSMENT — PAIN DESCRIPTION - PROGRESSION: CLINICAL_PROGRESSION: NOT CHANGED

## 2021-12-27 NOTE — ANESTHESIA POSTPROCEDURE EVALUATION
Department of Anesthesiology  Postprocedure Note    Patient: Lidya Walden  MRN: [id number]  [place]: 2000  Date of evaluation: 2021  Time:  9:58 AM     Procedure Summary     Date: 21 Room / Location: [address] [place] [address]    Anesthesia Start: 1640 Anesthesia Stop: 3834    Procedure:  SECTION (N/A Abdomen) Diagnosis: (Prior Uterine Surgery)    Surgeons: Jaylin Rodriguez MD Responsible Provider: Cammie Buitrago MD    Anesthesia Type: spinal ASA Status: 2          Anesthesia Type: spinal    Trent Phase I: Trent Score: 9    Trent Phase II:      Last vitals: Reviewed and per EMR flowsheets.        Anesthesia Post Evaluation    Patient location during evaluation: PACU  Patient participation: complete - patient participated  Level of consciousness: awake  Airway patency: patent  Nausea & Vomiting: no nausea and no vomiting  Complications: no  Cardiovascular status: hemodynamically stable  Respiratory status: acceptable  Hydration status: euvolemic

## 2021-12-27 NOTE — OP NOTE
[address]                                OPERATIVE REPORT    PATIENT NAME: Thomas Day                     :        2000  MED REC NO:   [id number]                            ROOM:       [facility]  ACCOUNT NO:   [de-identified]                           ADMIT DATE: 2021. PROVIDER:     Rosio Mahan MD    DATE OF PROCEDURE:  2021    PREOPERATIVE DIAGNOSIS:  Intrauterine pregnancy at 37 weeks and 4 days  with bleeding. Suspected partial abruptio. Previous . Noncompliant with prenatal care. POSTOPERATIVE DIAGNOSIS:  Intrauterine pregnancy at 37 weeks and 4 days  with bleeding. Suspected partial abruptio. Previous . Noncompliant with prenatal care as well as approximately 10% abruptio  placenta. PROCEDURE PERFORMED:  Repeat low-transverse . SURGEON:  Rosio Mahan MD    ANESTHESIA:  Spinal.    EBL:  500. REPLACEMENT:  2 liters. URINE OUTPUT:  1 liter. FINDINGS:  Male infant weighing 6 pounds 3 ounces with cord arterial gas  pH 7.233, base excess -4.3. Cord venous gas pH 7.305, base excess -3.4. Normal-appearing tubes and ovaries. Approximately 10% abruptio  placenta. Marginal insertion of umbilical cord. The patient is giving  the infant up for adoption. Normal-appearing tubes and ovaries. COMPLICATIONS:  None. COUNTS:  Sponge, lap, and needle counts were correct. DISPOSITION:  The patient went to recovery room in stable condition. PROCEDURE IN DETAIL:  After informed consent was obtained, and under an  adequate level of spinal anesthesia, the patient's abdomen was prepped  with DuraPrep. The patient was grounded. The patient was draped with  sterile drapes. Low-transverse skin incision was made. Incision was  taken down to the level of fascia. Fascia was nicked in midline and the  incision was carried upwards bilaterally. Care was made to assure that  the fascial     incision did not extend beyond the borders of the rectus  muscle. Fascia was dissected off the bellies of rectus muscle. Bellies  of rectus muscle was sharply . Peritoneum was tented and  sharply entered with care to avoid bowel or bladder. Incision was  carried out cephalad and caudad. Bladder flap was made. Low uterine  cervical transverse incision was made and carried upwards bilaterally  using surgeon's fingers. Amniotic fluid sac was ruptured and clear  fluid issued. Fetal vertex was identified and gently delivered into the  incision. Fetal trunk was delivered with minimal traction applied in an  axis  parallel to the fetal spine. Nares and hypopharynx were  suctioned. Delayed cord clamping was performed. Cord was milked. Cord  was clamped and cut. Infant was crying and vigorous. Cord gases and  blood samples were obtained. Placenta was removed spontaneously. Note  was made of an intact two artery, one vein cord placenta with marginal  insertion of the cord and approximately 10% abruptio. Placenta was sent  to pathology. Uterus was curettaged with lap x2. Uterine incision was  reapproximated in two layers using a running locking suture of 0 Vicryl. All clots were evacuated. All pedicles were examined were hemostatic. Parietal peritoneum was reapproximated in running fashion. Fascia was  reapproximated in interrupted fashion with sutures placed a centimeter  apart and 2 cm in with care to avoid injury to the ilioinguinal or  iliohypogastric nerves. Subcu was reapproximated in interrupted  fashion. Skin was reapproximated in running fashion. Sponge, lap,  needle counts were correct. There were no complications. The patient  tolerated the procedure well and went to recovery room in stable  condition.         Lucille Adame MD    D: 12/26/2021 18:04:53       T: 12/26/2021 18:07:14     [email or web address]  Job#: [id number]     Doc#: [id number]    CC:

## 2021-12-27 NOTE — PROGRESS NOTES
Skin to skin initiated adoptive mother with baby at 1 . Baby alert, pink, respirations regular. Adoptive Mother educated on safe skin to skin practices with proper position of baby and assurance of unobstructed airway; verbalized understanding at 1.

## 2021-12-27 NOTE — PROGRESS NOTES
Patient up to attempt to void. Tolerated ambulation well. Unable to void at this time. Returned to bed with call light in reach.

## 2021-12-27 NOTE — CARE COORDINATION
2021: SS Note:  SS Consult noted regarding \"infant of substance abuse mom; baby for adoption\" pt's UDS + for Cannabinoid and pt + COVID, sw attempted to speak with pt by phone, currently no answer, nursing informed, unsure of adoption agency or adoption  involvement, UDS on  pending, sw to follow.  Electronically signed by JACQUELINE Mcgrath on 2021 at 10:55 AM

## 2021-12-27 NOTE — PROGRESS NOTES
[facility] [place] Hearing screening results were discussed with parent. Questions answered. Brochure given to parent. Advised to monitor developmental milestones and contact physician for any concerns.    Derik Serrano

## 2021-12-27 NOTE — CARE COORDINATION
2021: SS Note:  SW spoke with pt, Muriel Cutler and the prospective adoptive mother, Julieta Gunderson (ph# [phone] by phone regarding consult, Muriel Cutler says she is planning to proceed with an adoption plan for her  son, Ev Pulliam with [employer] Frey who is a  for kompany, Virginia Wilkinsjaymie informed sw that they do have an adoption  and Muriel Cutler was going to sign POA of  over to her but their  is unable to meet with her now at the hospital due to her testing positive for COVID. Anticipate baby being released to Muriel Vannroyce now at discharge and they will arrange to meet at the 's office after Muriel Cutler has a negative COVID test.  Muriel Cutler reports having all the necessary supports and provisions to safely care for baby at her home until she can proceed with the adoption plan. BRIGHT- mandated  guide for plan of safe care assessment completed, Muriel Cutler did admit to marijuana use about 3 weeks ago due to having pain from her pregnancy but denies drug addiction, need to speak with PRS or need for treatment and she plans to stop all illicit drug use, she lives with her step mother, Arcelia Porras, ph# [phone] who she list as her support person for plan of care and has custody of her 3year old son Norris Mota, she did have past [facility] involvement due to her and her Norris Mota testing positive for marijuana after his delivery but case was closed. BRIGHT report called to [facility] Intake screener for [facility], 's UDS was negative, awaiting supervisor determination but anticipate referral being screened out with no ongoing agency involvement, NO HOLD on  for release home at discharge, nursing informed. Sw to follow as needed.  Electronically signed by JACQUELINE Guaman on 2021 at 2:05 PM

## 2021-12-27 NOTE — PROGRESS NOTES
Assumed care of patient for this shift. Plan of care for tonight discussed, patient verbalizes understanding and has no questions at this time. Still in PACU at this time until 2000. Patient is resting quietly with call light in reach.

## 2021-12-27 NOTE — H&P
[address]                              HISTORY AND PHYSICAL    PATIENT NAME: Bailey Thomas                     :        2000  MED REC NO:   [id number]                            ROOM:       0215  ACCOUNT NO:   [de-identified]                           ADMIT DATE: 2021. PROVIDER:     Bethany Alvares MD    CHIEF COMPLAINT:  A 71-year-old black female, para 0-1-0-1, presents  with complaints of blood running down her legs approximately an  hour-and-a-half prior to presentation, which would have been 1430 hours. She then presented to the hospital.  No leaking fluid. No change in her  bowel or urinary habits. No chest pain or shortness of breath. No calf  pain. Has a history of pyelonephritis and was supposed to be taking  Omnicef 300 mg p.o. b.i.d., which she discontinued on her own several  days ago and did not complete the prescription. Group B strep is  positive. She had a previous  at 26-1/2 weeks. At that time,  she developed a pelvic abscess postoperatively. Has been noncompliant  with prenatal care. For her past medical, surgical, GYN, social, family history; please  refer to ACOG forms A and B. REVIEW OF SYSTEMS:  Negative for cardiac, respiratory, GI,   abnormalities. PHYSICAL EXAMINATION:  VITAL SIGNS:  Stable. She is afebrile. O2 sat 98% on room air. Blood  pressure 124/84, pulse 106, respiratory rate 18, temp 98.7. HEENT:  Negative. LUNGS:  Clear to auscultation. CV:  Regular rate and rhythm. ABDOMEN:  Gravid, soft, nontender. Estimated fetal weight is 2800 gm. Fetal heart tones are present in the 160s with [id number] variability  present. Acceleration is noted. Cervix is 2 and long, vertex -2 with  moderate blood. EXTREMITIES:  No clubbing, cyanosis, edema. No cords or erythema.     ASSESSMENT:  Intrauterine pregnancy at 37 weeks and 4 days by third  trimester ultrasound in labor. Group B strep is  positive. Previous  with complications postoperatively after  that . Late prenatal care. Previous drug screens positive for  Marijuana. Possible partial abruptio. Amnisure is positive, but amniotic fluid bag is palatable, so suspect false positive. PLAN:  Plan is for repeat . Risks of the procedure have been  reviewed, including but not limited to infection, bleeding, injury to  bowel, bladder, ureter, major vessels, fetal death, fetal damage, need  for respiratory assistance of the , prolonged time to delivery  due to extensive scarring from previous  complications. She  has been notified in the event of excessive hemorrhage, she may require  blood transfusion. All her questions have been answered and she wishes  to proceed with surgery.         Sarah Ledezma MD    D: 2021 16:25:42       T: 2021 16:28:33     [email or web address]  Job#: [id number]     Doc#: [id number]    CC:

## 2021-12-27 NOTE — PLAN OF CARE
Problem: Pain:  Goal: Pain level will decrease  Description: Pain level will decrease  Outcome: Met This Shift     Problem: Airway Clearance - Ineffective  Goal: Achieve or maintain patent airway  Outcome: Met This Shift     Problem: Breathing Pattern - Ineffective  Goal: Ability to achieve and maintain a regular respiratory rate  Outcome: Met This Shift     Problem:  Body Temperature -  Risk of, Imbalanced  Goal: Ability to maintain a body temperature within defined limits  Outcome: Met This Shift  Goal: Will regain or maintain usual level of consciousness  Outcome: Met This Shift  Goal: Complications related to the disease process, condition or treatment will be avoided or minimized  Outcome: Met This Shift     Problem: Isolation Precautions - Risk of Spread of Infection  Goal: Prevent transmission of infection  Outcome: Met This Shift     Problem: Nutrition Deficits  Goal: Optimize nutritional status  Outcome: Met This Shift

## 2021-12-28 LAB
COMMENT: NORMAL
HCT VFR BLD CALC: 24.4 % (ref 34–48)
HEMOGLOBIN: 7.6 G/DL (ref 11.5–15.5)
MCH RBC QN AUTO: 26.8 PG (ref 26–35)
MCHC RBC AUTO-ENTMCNC: 31.1 % (ref 32–34.5)
MCV RBC AUTO: 85.9 FL (ref 80–99.9)
PDW BLD-RTO: 14.7 FL (ref 11.5–15)
PLATELET # BLD: 338 E9/L (ref 130–450)
PMV BLD AUTO: 11.3 FL (ref 7–12)
RBC # BLD: 2.84 E12/L (ref 3.5–5.5)
THC NORMALIZED, QUANTITIATIVE, URINE: 135
THC-COOH, QUANTITATIVE, URINE: 93.8
URINE CULTURE, ROUTINE: NORMAL
WBC # BLD: 9.9 E9/L (ref 4.5–11.5)

## 2021-12-28 PROCEDURE — 1220000001 HC SEMI PRIVATE L&D R&B

## 2021-12-28 PROCEDURE — 85027 COMPLETE CBC AUTOMATED: CPT

## 2021-12-28 PROCEDURE — 6370000000 HC RX 637 (ALT 250 FOR IP): Performed by: LEGAL MEDICINE

## 2021-12-28 PROCEDURE — 36415 COLL VENOUS BLD VENIPUNCTURE: CPT

## 2021-12-28 PROCEDURE — 2580000003 HC RX 258: Performed by: LEGAL MEDICINE

## 2021-12-28 PROCEDURE — 6360000002 HC RX W HCPCS: Performed by: LEGAL MEDICINE

## 2021-12-28 RX ADMIN — ACETAMINOPHEN 1000 MG: 500 TABLET ORAL at 12:37

## 2021-12-28 RX ADMIN — ACETAMINOPHEN 1000 MG: 500 TABLET ORAL at 06:12

## 2021-12-28 RX ADMIN — FERROUS SULFATE TAB 325 MG (65 MG ELEMENTAL FE) 325 MG: 325 (65 FE) TAB at 12:37

## 2021-12-28 RX ADMIN — SODIUM CHLORIDE 125 MG: 9 INJECTION, SOLUTION INTRAVENOUS at 12:37

## 2021-12-28 RX ADMIN — DOCUSATE SODIUM 100 MG: 100 CAPSULE ORAL at 21:15

## 2021-12-28 RX ADMIN — OXYCODONE 5 MG: 5 TABLET ORAL at 14:17

## 2021-12-28 RX ADMIN — OXYCODONE 10 MG: 5 TABLET ORAL at 18:38

## 2021-12-28 RX ADMIN — OXYCODONE 10 MG: 5 TABLET ORAL at 03:25

## 2021-12-28 RX ADMIN — METOCLOPRAMIDE 10 MG: 10 TABLET ORAL at 21:15

## 2021-12-28 RX ADMIN — OXYCODONE 10 MG: 5 TABLET ORAL at 08:36

## 2021-12-28 RX ADMIN — FERROUS SULFATE TAB 325 MG (65 MG ELEMENTAL FE) 325 MG: 325 (65 FE) TAB at 08:36

## 2021-12-28 RX ADMIN — DOCUSATE SODIUM 100 MG: 100 CAPSULE ORAL at 08:36

## 2021-12-28 RX ADMIN — ACETAMINOPHEN 1000 MG: 500 TABLET ORAL at 18:02

## 2021-12-28 RX ADMIN — PRENATAL WITH FERROUS FUM AND FOLIC ACID 1 TABLET: 3080; 920; 120; 400; 22; 1.84; 3; 20; 10; 1; 12; 200; 27; 25; 2 TABLET ORAL at 08:36

## 2021-12-28 RX ADMIN — METOCLOPRAMIDE 10 MG: 10 TABLET ORAL at 08:36

## 2021-12-28 RX ADMIN — SODIUM CHLORIDE, PRESERVATIVE FREE 10 ML: 5 INJECTION INTRAVENOUS at 21:15

## 2021-12-28 RX ADMIN — FERROUS SULFATE TAB 325 MG (65 MG ELEMENTAL FE) 325 MG: 325 (65 FE) TAB at 18:02

## 2021-12-28 RX ADMIN — METOCLOPRAMIDE 10 MG: 10 TABLET ORAL at 11:50

## 2021-12-28 RX ADMIN — METOCLOPRAMIDE 10 MG: 10 TABLET ORAL at 18:02

## 2021-12-28 ASSESSMENT — PAIN SCALES - GENERAL
PAINLEVEL_OUTOF10: 8
PAINLEVEL_OUTOF10: 0
PAINLEVEL_OUTOF10: 7
PAINLEVEL_OUTOF10: 3
PAINLEVEL_OUTOF10: 2
PAINLEVEL_OUTOF10: 6
PAINLEVEL_OUTOF10: 0
PAINLEVEL_OUTOF10: 5
PAINLEVEL_OUTOF10: 8
PAINLEVEL_OUTOF10: 8

## 2021-12-28 ASSESSMENT — PAIN DESCRIPTION - DESCRIPTORS
DESCRIPTORS: BURNING;DISCOMFORT;SORE
DESCRIPTORS: CONSTANT;DISCOMFORT
DESCRIPTORS: BURNING;DISCOMFORT;SORE

## 2021-12-28 ASSESSMENT — PAIN DESCRIPTION - ORIENTATION: ORIENTATION: MID;LOWER

## 2021-12-28 ASSESSMENT — PAIN - FUNCTIONAL ASSESSMENT: PAIN_FUNCTIONAL_ASSESSMENT: ACTIVITIES ARE NOT PREVENTED

## 2021-12-28 ASSESSMENT — PAIN DESCRIPTION - PROGRESSION: CLINICAL_PROGRESSION: NOT CHANGED

## 2021-12-28 ASSESSMENT — PAIN DESCRIPTION - ONSET: ONSET: ON-GOING

## 2021-12-28 ASSESSMENT — PAIN DESCRIPTION - FREQUENCY: FREQUENCY: INTERMITTENT

## 2021-12-28 ASSESSMENT — PAIN DESCRIPTION - PAIN TYPE: TYPE: CHRONIC PAIN

## 2021-12-28 ASSESSMENT — ENCOUNTER SYMPTOMS: CRAMPS: 1

## 2021-12-28 ASSESSMENT — PAIN DESCRIPTION - LOCATION: LOCATION: INCISION

## 2021-12-28 NOTE — DISCHARGE SUMMARY
[address]                               DISCHARGE SUMMARY    PATIENT NAME: Lesley Xiong                     :        2000  MED REC NO:   [id number]                            ROOM:       0215  ACCOUNT NO:   [de-identified]                           ADMIT DATE: 2021. PROVIDER:     Aristides Guzman MD                  DISCHARGE DATE:    ADMITTING DIAGNOSES:  Intrauterine pregnancy at 37 weeks and 2 days with  complaints of bleeding. Previous . History of pyelonephritis,  noncompliant with antibiotic therapy. DISCHARGE DIAGNOSES:  Intrauterine pregnancy at 37 weeks and 2 days with  complaints of bleeding. Previous . History of pyelonephritis,  noncompliant with antibiotic therapy. The patient in labor with  partial abruption. The patient is tested positive for COVID, and is  asymptomatic. PROCEDURE PERFORMED:  Repeat low-transverse . HOSPITAL COURSE IN DETAIL:  The patient is doing well. Voiding well. No nausea or vomiting. Passing flatus. Minimal lochia. Pain is under  adequate control. No chest pain or shortness of breath. No calf pain. No PIH symptoms. Vital signs are stable. Afebrile. .  Lungs:  Clear to  auscultation. CV:  Regular rate and rhythm. Abdomen:  Soft,  nondistended, appropriately tender. No rebound or guarding. Normal  bowel sounds are present. Fundus is firm and two fingerbreadths below  the umbilicus. Incision dry and intact. Perineum dry and intact. Extremities:  No clubbing, cyanosis or edema. No cords or erythema. ASSESSMENT:  The patient is doing well, postop day #1. Stable. PLAN:  Home tomorrow if meets discharge criteria with fever, bleeding,  emboli, lifting, climbing, driving precautions. She is to follow up at  the office in 6 weeks. She indicates understanding of all instructions.         Jose Avendaño MD    D: 12/27/2021 19:07:48       T: 12/27/2021 19:10:12     [email or web address]  Job#: [id number]     Doc#: [id number]    CC:

## 2021-12-28 NOTE — PROGRESS NOTES
Patient called out reporting feeling very sweaty upon waking. Vital signs assessed. Will continue to monitor.

## 2021-12-28 NOTE — CONSULTS
[facility]  Consult Note    [address] [facility] [address]  [place], [address]  Phone [phone]   [phone]      Admit Date: 2021  3:08 PM  Pt Name: Yoan Olson  MRN: [id number]  : 2000  Reason for Consult:    Chief Complaint   Patient presents with    Abdominal Cramping     Requesting Physician:  Jose Patel MD  PCP: Virginia Hunt MD  History Obtained From:  patient, chart   ID consulted for Term pregnancy [Z34.90]  on hospital day [address]       Chief Complaint   Patient presents with    Abdominal Cramping     HISTORYOF PRESENT ILLNESS   Yoan Olson is a 22 y.o. female who presents with   has no past medical history on file. ED TRIAGEVITALS  BP: 129/74, Temp: 98.8 °F (37.1 °C), Pulse: 98, Resp: 18, SpO2: 98 %  HPI pt known to ID last seen for complicated  uti   urine cx with Strep agalactiae (Beta Strep Group B)   She was on ampicillin-sulbactam (UNASYN) 3000 mg ivpb minibag, Q6H  cefTRIAXone (ROCEPHIN) 2,000 mg in sodium chloride (PF) 20 mL IV syringe, Q24H  d/c on 12/15 with omnicef per chart pt did not complete rx course  She presents on  with blood running down her legs  S/p  Repeat low-transverse . Intrauterine pregnancy at 37 weeks and 4 days with bleeding. Suspected partial abruptio.      Testes covid +  ID was asked to see  Currently afebrile [id number] tc 98.8    foster mother present      REVIEW OF SYSTEMS     CONSTITUTIONAL:    fever, chills, no weight loss  ALLERGIES:    No urticaria, hay fever,    EYES:     No blurry vision, loss of vision, eye pain  ENT:      No hearing loss, sore throat  CARDIOVASCULAR:   No chest pain or palpitations  RESPIRATORY:   No cough, sob  ENDOCRINE:    No increase thirst, urination   HEME-LYMPH:   No easy bruising or bleeding  GI:     No nausea, vomiting or diarrhea  :     No urinary complaints  NEURO:    No seizures, stroke, HA  MUSCULOSKELETAL:  No muscle aches or pain, no joint pain  SKIN:     No rash or itch  PSYCH:    No depression or anxiety    Medications Prior to Admission: cefdinir (OMNICEF) 300 MG capsule, Take 1 capsule by mouth every 12 hours for 14 days  Prenatal MV-Min-Fe Fum-FA-DHA (PRENATAL 1 PO), Take by mouth  CURRENT MEDICATIONS     Current Facility-Administered Medications:     ferric gluconate (FERRLECIT) 125 mg in sodium chloride 0.9 % 100 mL IVPB, 125 mg, IntraVENous, Q24H, Rosy Evans MD, Stopped at 12/28/21 1337    lactated ringers infusion, , IntraVENous, Continuous, Rosy Evans MD, Stopped at 12/27/21 0603    sodium chloride flush 0.9 % injection 10 mL, 10 mL, IntraVENous, 2 times per day, Rosy Evans MD    sodium chloride flush 0.9 % injection 10 mL, 10 mL, IntraVENous, PRN, Rosy Evans MD    0.9 % sodium chloride infusion, 25 mL, IntraVENous, PRN, Rosy Evans MD    carboprost (HEMABATE) injection 250 mcg, 250 mcg, IntraMUSCular, PRN, Rosy Evans MD    methylergonovine (METHERGINE) injection 200 mcg, 200 mcg, IntraMUSCular, PRN, Rosy Evans MD    acetaminophen (TYLENOL) tablet 1,000 mg, 1,000 mg, Oral, Q6H, Rosy Evans MD, 1,000 mg at 12/28/21 1237    oxyCODONE (ROXICODONE) immediate release tablet 5 mg, 5 mg, Oral, Q4H PRN, 5 mg at 12/28/21 1417 **OR** oxyCODONE (ROXICODONE) immediate release tablet 10 mg, 10 mg, Oral, Q4H PRN, Rosy Evans MD, 10 mg at 12/28/21 0836    naloxone (NARCAN) injection 0.4 mg, 0.4 mg, IntraVENous, PRN, Rosy Evans MD    ondansetron (ZOFRAN) injection 4 mg, 4 mg, IntraVENous, Q6H PRN, Rosy Evans MD    diphenhydrAMINE (BENADRYL) injection 25 mg, 25 mg, IntraVENous, Q6H PRN, Rosy Evans MD    docusate sodium (COLACE) capsule 100 mg, 100 mg, Oral, BID, Rosy Evans MD, 100 mg at 12/28/21 0836    lansinoh lanolin ointment, , Topical, Q1H PRN, Rosy Evans MD    ferrous sulfate (IRON 325) tablet 325 mg, 325 mg, Oral, TID CHELSEA, Lindsey Nicholas, MD, 325 mg at 21 1237    prenatal vitamin 27-1 MG tablet 1 tablet, 1 tablet, Oral, Daily, Jose Patel MD, 1 tablet at 21 0836    measles, mumps & rubella vaccine (MMR) injection 0.5 mL, 0.5 mL, SubCUTAneous, Prior to discharge, Jose Patel MD    tetanus-diphth-acell pertussis (BOOSTRIX) injection 0.5 mL, 0.5 mL, IntraMUSCular, Prior to discharge, Jose Patel MD    metoclopramide (REGLAN) tablet 10 mg, 10 mg, Oral, 4x Daily AC & HS, Jose Patel MD, 10 mg at 21 1150    oxytocin (PITOCIN) 30 units in 500 mL infusion, 1-20 kim-units/min, IntraVENous, Continuous, Jose Patel MD, Stopped at 21 0603  ALLERGIES     Seasonal  Immunization History   Administered Date(s) Administered    Influenza, Quadv, 6 mo and older, IM, PF (Flulaval, Fluarix) 2019    MMR 2019    Tdap (Boostrix, Adacel) 2019      Internal Administration   First Dose      Second Dose           Last COVID Lab SARS-CoV-2, PCR (no units)   Date Value   2021 DETECTED (A)     SARS-CoV-2, NAAT (no units)   Date Value   2021 DETECTED (A)            PAST MEDICAL HISTORY   No past medical history on file.   SURGICAL HISTORY       Past Surgical History:   Procedure Laterality Date    CERVICAL CERCLAGE       SECTION N/A 2019     SECTION performed by Maria Del Carmen [facility]     SECTION N/A 2021     SECTION performed by Jose Patel MD at [facility] L[facility]D OR    VT OFFICE/OUTPT VISIT,PROCEDURE ONLY N/A 2018    CERVIX CERCLAGE performed by Jose Patel MD at [address] [place] [address] HISTORY       Family History   Problem Relation Age of Onset    Colon Cancer Mother     Asthma Mother     Heart Attack Maternal Grandfather      SOCIAL HISTORY       Social History     Socioeconomic History    Marital status: Single     Spouse name: Not on file    Number of children: Not on file    Years of education: Not on file    Highest education level: Not on file   Occupational History    Not on file   Tobacco Use    Smoking status: Current Every Day Smoker     Packs/day: 0.50     Years: 3.00     Pack years: 1.50    Smokeless tobacco: Never Used   Vaping Use    Vaping Use: Never used   Substance and Sexual Activity    Alcohol use: No    Drug use: No    Sexual activity: Not Currently     Comment: placenta previa   Other Topics Concern    Not on file   Social History Narrative    Not on file     Social Determinants of Health     Financial Resource Strain:     Difficulty of Paying Living Expenses: Not on file   Food Insecurity:     Worried About Running Out of Food in the Last Year: Not on file    Leticia of Food in the Last Year: Not on file   Transportation Needs:     Lack of Transportation (Medical): Not on file    Lack of Transportation (Non-Medical):  Not on file   Physical Activity:     Days of Exercise per Week: Not on file    Minutes of Exercise per Session: Not on file   Stress:     Feeling of Stress : Not on file   Social Connections:     Frequency of Communication with Friends and Family: Not on file    Frequency of Social Gatherings with Friends and Family: Not on file    Attends Jain Services: Not on file    Active Member of [address] [place] [address] or Organizations: Not on file    Attends Club or Organization Meetings: Not on file    Marital Status: Not on file   Intimate Partner Violence:     Fear of Current or Ex-Partner: Not on file    Emotionally Abused: Not on file    Physically Abused: Not on file    Sexually Abused: Not on file   Housing Stability:     Unable to Pay for Housing in the Last Year: Not on file    Number of [place] in the Last Year: Not on file    Unstable Housing in the Last Year: Not on file     PHYSICAL EXAM         Vitals:    12/28/21 0943 12/28/21 1152 12/28/21 1330 12/28/21 1410   BP:  118/62 129/74    Pulse:  89 98    Resp:  14 18    Temp: 98.4 °F (36.9 °C) 98.7 °F (37.1 °C) 99.1 °F (37.3 °C) 98.8 °F (37.1 °C)   TempSrc: Oral Oral Oral Oral   SpO2:  96% 98%    Weight:       Height:         Physical Exam   Constitutional/General: Alert and oriented, NAD  Head: NC/AT  Eyes: PERRL, EOMI  Mouth: Normal mucosa, no thrush   Neck: Supple, full ROM,    Pulmonary: Lungs clear to auscultation bilaterally post . Not in respiratory distress  Cardiovascular:  Regular rate and rhythm, no murmurs, gallops, or rubs. Abdomen: Soft, + BS.   distension. Nontender. Extremities: Moves all extremities x 4. Warm and well perfused  Pulses:  Distal pulses intact  Skin: Warm and dry without rash  Neurologic:    No focal deficits  Psych: Normal Affect  c section intact      DIAGNOSTIC RESULTS   RADIOLOGY:   US OB 14 PLUS WEEKS SINGLE OR FIRST GESTATION    Result Date: 12/11/2021  EXAMINATION: TRANSABDOMINAL SECOND/THIRD TRIMESTER OBSTETRIC PELVIC ULTRASOUND WITH COLOR DOPPLER FLOW; BIOPHYSICAL PROFILE WITHOUT NON-STRESS TEST 12/11/2021 TECHNIQUE: TRANSABDOMINAL PELVIC ULTRASOUND WITH COLOR DOPPLER FLOW; ULTRASOUND BIOPHYSICAL PROFILE WITHOUT NON-STRESS TEST COMPARISON: None available. HISTORY: ORDERING SYSTEM PROVIDED HISTORY: limited prenatal visits TECHNOLOGIST PROVIDED HISTORY: Reason for exam:->limited prenatal visits What reading provider will be dictating this exam?->CRC; ORDERING SYSTEM PROVIDED HISTORY: fetal wellbeing TECHNOLOGIST PROVIDED HISTORY: With MARIAELENA please Reason for exam:->fetal wellbeing What reading provider will be dictating this exam?->CRC FINDINGS: A single live intrauterine pregnancy is present. Fetal heart rate measures 150 beats per minute. There is normal fetal body and limb movement. The fetus is in cephalic position. The placenta is located left lateral. The amniotic fluid volume is visually normal and the amniotic fluid index measures 13.73 cm. The cervix is not well seen secondary to shadowing from the fetal calvarium.  Systolic to diastolic ratios were 2.0, 2.2, and 2.0 (normal for gestational age). BPD measures 8.73 cm. 35 weeks and 2 days. Head circumference measures 31.23 cm. 35 weeks and 0 days. Abdominal circumference measures 30.51 cm. 34 weeks and 3 days. Femur length measures 6.75 cm. 34 weeks and 5 days. Estimated fetal weight is 2483 grams which correlates to 27th percentile based upon last menstrual period. BIOPHYSICAL PROFILE: Fetal Tone:  2/2 Gross Body:  2/2 Breathin/2 Qualitative Fluid:  2/2 Biophysical Profile Score:  8/8 Estimated gestational age by current ultrasound is 34 weeks and 6 days Estimated date of delivery based on current ultrasound: 2022 Clinical age provided: 27 weeks and 3 days Estimated date of delivery based on clinical age provided: 2022.     1.  Single live intrauterine pregnancy with gestational age of 29 weeks and 6 days by current sonographic biometry which is concordant with the gestational age provided of 27 weeks and 3 days. The estimated due date based on current ultrasound is 2864. 2.  Cephalic presentation with no evidence of previa. Fetal heart rate was 150 beats per minute. Visually normal amniotic fluid. Estimated fetal weight was 2483 g. 3.  Normal biophysical profile, 8 of 8. Systolic to diastolic ratios ranged from 2.0-2.2.     US FETAL BIOPHYSICAL PROFILE WO NON STRESS TESTING    Result Date: 2021  EXAMINATION: TRANSABDOMINAL SECOND/THIRD TRIMESTER OBSTETRIC PELVIC ULTRASOUND WITH COLOR DOPPLER FLOW; BIOPHYSICAL PROFILE WITHOUT NON-STRESS TEST 2021 TECHNIQUE: TRANSABDOMINAL PELVIC ULTRASOUND WITH COLOR DOPPLER FLOW; ULTRASOUND BIOPHYSICAL PROFILE WITHOUT NON-STRESS TEST COMPARISON: None available.  HISTORY: ORDERING SYSTEM PROVIDED HISTORY: limited prenatal visits TECHNOLOGIST PROVIDED HISTORY: Reason for exam:->limited prenatal visits What reading provider will be dictating this exam?->CRC; ORDERING SYSTEM PROVIDED HISTORY: fetal wellbeing TECHNOLOGIST PROVIDED HISTORY: With MARIAELENA please Reason for exam:->fetal wellbeing What reading provider will be dictating this exam?->CRC FINDINGS: A single live intrauterine pregnancy is present. Fetal heart rate measures 150 beats per minute. There is normal fetal body and limb movement. The fetus is in cephalic position. The placenta is located left lateral. The amniotic fluid volume is visually normal and the amniotic fluid index measures 13.73 cm. The cervix is not well seen secondary to shadowing from the fetal calvarium. Systolic to diastolic ratios were 2.0, 2.2, and 2.0 (normal for gestational age). BPD measures 8.73 cm. 35 weeks and 2 days. Head circumference measures 31.23 cm. 35 weeks and 0 days. Abdominal circumference measures 30.51 cm. 34 weeks and 3 days. Femur length measures 6.75 cm. 34 weeks and 5 days. Estimated fetal weight is 2483 grams which correlates to 27th percentile based upon last menstrual period. BIOPHYSICAL PROFILE: Fetal Tone:  2/2 Gross Body:  2/2 Breathin/2 Qualitative Fluid:  2/2 Biophysical Profile Score:  8/8 Estimated gestational age by current ultrasound is 34 weeks and 6 days Estimated date of delivery based on current ultrasound: 2022 Clinical age provided: 27 weeks and 3 days Estimated date of delivery based on clinical age provided: 2022.     1.  Single live intrauterine pregnancy with gestational age of 29 weeks and 6 days by current sonographic biometry which is concordant with the gestational age provided of 27 weeks and 3 days. The estimated due date based on current ultrasound is 8548. 2.  Cephalic presentation with no evidence of previa. Fetal heart rate was 150 beats per minute. Visually normal amniotic fluid. Estimated fetal weight was 2483 g. 3.  Normal biophysical profile, 8 of 8.   Systolic to diastolic ratios ranged from 2.0-2.2.     US RETROPERITONEAL COMPLETE    Result Date: 2021  EXAMINATION: RETROPERITONEAL ULTRASOUND OF THE KIDNEYS AND URINARY BLADDER 12/12/2021 COMPARISON: January 7, 2019 HISTORY: ORDERING SYSTEM PROVIDED HISTORY: urinary tract infection FINDINGS: Kidneys: The right kidney measures 10.9 x 6.3 x 5.9 cm and the left kidney measures 13.3 x 6.9 x 7.9 cm. Kidneys demonstrate normal cortical echogenicity. Moderate bilateral hydronephrosis noted. No evidence of intrarenal stones. Bladder: The partially distended bladder is grossly unremarkable. Moderate bilateral hydronephrosis. LABS  Recent Labs     12/26/21  1550 12/27/21  0605 12/28/21  0945   WBC 14.2*  --  9.9   HGB 9.2* 8.1* 7.6*   HCT 29.2*  --  24.4*   MCV 83.9  --  85.9   *  --  338     Recent Labs     12/26/21  1550      K 4.3      CO2 18*   BUN 10   CREATININE 0.7   GFRAA >60   LABGLOM >60   GLUCOSE 69*   PROT 7.6   LABALBU 3.6   CALCIUM 8.5*   BILITOT 0.2   ALKPHOS 191*   AST 15   ALT 8     No results for input(s): PROCAL in the last 72 hours.   Lab Results   Component Value Date    CRP 1.8 (H) 01/08/2019    CRP 7.3 (H) 01/01/2019     Lab Results   Component Value Date    SEDRATE 62 (H) 01/08/2019    SEDRATE 52 (H) 01/01/2019     Lab Results   Component Value Date    [id number] see below 11/15/2021     Lab Results   Component Value Date    COVID19 DETECTED 12/26/2021     COVID-19/CONSTANCE-COV2 LABS  Recent Labs     12/26/21  1550   AST 15   ALT 8       Lab Results   Component Value Date    HCVABI Non-Reactive 11/15/2021     Hep C Ab Interp   Date Value Ref Range Status   11/15/2021 Non-Reactive Non-Reactive Final       MICROBIOLOGY:       Lab Results   Component Value Date    BC 5 Days no growth 12/11/2021    BC 5 Days- no growth 01/01/2019    ORG Strep agalactiae (Beta Strep Group B) 12/11/2021    ORG Escherichia coli 12/11/2021    ORG Escherichia coli 11/15/2021     Lab Results   Component Value Date    BLOODCULT2 5 Days no growth 12/11/2021    BLOODCULT2 5 Days- no growth 01/01/2019    ORG Strep agalactiae (Beta Strep Group B) 12/11/2021    ORG Escherichia coli 12/11/2021    ORG Escherichia coli 11/15/2021       Body Fluid Culture, Sterile   Date Value Ref Range Status   01/08/2019 Growth not present  Final     No results found for: CXSURG  Urine Culture, Routine   Date Value Ref Range Status   12/26/2021 Growth not present  Final   12/11/2021 >100,000 CFU/ml  Final   11/15/2021 75,000 CFU/ml  Final       FINAL IMPRESSION    Patient is a 22 y.o. female who presented with   Chief Complaint   Patient presents with    Abdominal Cramping    and admitted for Term pregnancy [Z34.90]  S/p Csection   Leukocytosis/fevers viral syndrome   covid + on RA check labs   Substance abuse     h/o complicated uti/E coli/hydronephosis? pyelonephritis d/c with omnicef    F/u labs  counselled about covid   Since pt is on RA she does not qualify at this time for remdesivir/barciitnib/actemra  Will follow clinical sx    Imaging and labs were reviewed per medical records and any ID pertinent labs were addressed with the patient. The patient/FAMILY  was educated about the diagnosis, prognosis, indications, risks and benefits of treatment. An opportunity to ask questions was given to the patient/FAMILY and questions were answered. Thank you for involving me in the care of [facility]. Please do not hesitate to call for any questions or concerns.          Electronically signed by Melvina Goldberg MD on 12/28/2021 at 3:39 PM

## 2021-12-28 NOTE — PROGRESS NOTES
Electric breast pump assembled and explained to patient for use when ready. Patient to let RN know if further assistance is needed.

## 2021-12-29 VITALS
BODY MASS INDEX: 29.53 KG/M2 | TEMPERATURE: 97.7 F | HEIGHT: 64 IN | OXYGEN SATURATION: 98 % | WEIGHT: 173 LBS | HEART RATE: 67 BPM | SYSTOLIC BLOOD PRESSURE: 113 MMHG | RESPIRATION RATE: 16 BRPM | DIASTOLIC BLOOD PRESSURE: 54 MMHG

## 2021-12-29 PROBLEM — Z34.90 TERM PREGNANCY: Status: RESOLVED | Noted: 2021-12-26 | Resolved: 2021-12-29

## 2021-12-29 LAB
C-REACTIVE PROTEIN: 4.7 MG/DL (ref 0–0.4)
D DIMER: 580 NG/ML DDU
FERRITIN: 318 NG/ML
FIBRINOGEN: 649 MG/DL (ref 225–540)
INR BLD: 1
LACTATE DEHYDROGENASE: 102 U/L (ref 135–214)
LACTIC ACID: 0.5 MMOL/L (ref 0.5–2.2)
PROCALCITONIN: 0.4 NG/ML (ref 0–0.08)
PROTHROMBIN TIME: 11.5 SEC (ref 9.3–12.4)
SEDIMENTATION RATE, ERYTHROCYTE: 63 MM/HR (ref 0–20)
TOTAL CK: 196 U/L (ref 20–180)
TROPONIN, HIGH SENSITIVITY: <6 NG/L (ref 0–9)

## 2021-12-29 PROCEDURE — 6360000002 HC RX W HCPCS: Performed by: LEGAL MEDICINE

## 2021-12-29 PROCEDURE — 85378 FIBRIN DEGRADE SEMIQUANT: CPT

## 2021-12-29 PROCEDURE — 36415 COLL VENOUS BLD VENIPUNCTURE: CPT

## 2021-12-29 PROCEDURE — 82550 ASSAY OF CK (CPK): CPT

## 2021-12-29 PROCEDURE — 6370000000 HC RX 637 (ALT 250 FOR IP): Performed by: LEGAL MEDICINE

## 2021-12-29 PROCEDURE — 84484 ASSAY OF TROPONIN QUANT: CPT

## 2021-12-29 PROCEDURE — 82728 ASSAY OF FERRITIN: CPT

## 2021-12-29 PROCEDURE — 2580000003 HC RX 258: Performed by: LEGAL MEDICINE

## 2021-12-29 PROCEDURE — 83605 ASSAY OF LACTIC ACID: CPT

## 2021-12-29 PROCEDURE — 84145 PROCALCITONIN (PCT): CPT

## 2021-12-29 PROCEDURE — 85651 RBC SED RATE NONAUTOMATED: CPT

## 2021-12-29 PROCEDURE — 85384 FIBRINOGEN ACTIVITY: CPT

## 2021-12-29 PROCEDURE — 86140 C-REACTIVE PROTEIN: CPT

## 2021-12-29 PROCEDURE — 85610 PROTHROMBIN TIME: CPT

## 2021-12-29 PROCEDURE — 83615 LACTATE (LD) (LDH) ENZYME: CPT

## 2021-12-29 RX ADMIN — OXYCODONE 10 MG: 5 TABLET ORAL at 14:14

## 2021-12-29 RX ADMIN — ACETAMINOPHEN 1000 MG: 500 TABLET ORAL at 12:31

## 2021-12-29 RX ADMIN — FERROUS SULFATE TAB 325 MG (65 MG ELEMENTAL FE) 325 MG: 325 (65 FE) TAB at 09:08

## 2021-12-29 RX ADMIN — SODIUM CHLORIDE, PRESERVATIVE FREE 10 ML: 5 INJECTION INTRAVENOUS at 11:15

## 2021-12-29 RX ADMIN — DOCUSATE SODIUM 100 MG: 100 CAPSULE ORAL at 09:08

## 2021-12-29 RX ADMIN — FERROUS SULFATE TAB 325 MG (65 MG ELEMENTAL FE) 325 MG: 325 (65 FE) TAB at 12:31

## 2021-12-29 RX ADMIN — OXYCODONE 10 MG: 5 TABLET ORAL at 09:08

## 2021-12-29 RX ADMIN — SODIUM CHLORIDE 125 MG: 9 INJECTION, SOLUTION INTRAVENOUS at 12:37

## 2021-12-29 RX ADMIN — ACETAMINOPHEN 1000 MG: 500 TABLET ORAL at 00:03

## 2021-12-29 RX ADMIN — METOCLOPRAMIDE 10 MG: 10 TABLET ORAL at 12:31

## 2021-12-29 RX ADMIN — ACETAMINOPHEN 1000 MG: 500 TABLET ORAL at 06:13

## 2021-12-29 RX ADMIN — PRENATAL WITH FERROUS FUM AND FOLIC ACID 1 TABLET: 3080; 920; 120; 400; 22; 1.84; 3; 20; 10; 1; 12; 200; 27; 25; 2 TABLET ORAL at 09:08

## 2021-12-29 RX ADMIN — METOCLOPRAMIDE 10 MG: 10 TABLET ORAL at 05:59

## 2021-12-29 ASSESSMENT — PAIN SCALES - GENERAL
PAINLEVEL_OUTOF10: 7
PAINLEVEL_OUTOF10: 7
PAINLEVEL_OUTOF10: 0
PAINLEVEL_OUTOF10: 5
PAINLEVEL_OUTOF10: 0

## 2021-12-29 NOTE — DISCHARGE SUMMARY
[address]                               DISCHARGE SUMMARY    PATIENT NAME: Moses Ridley                     :        2000  MED REC NO:   [id number]                            ROOM:       0215  ACCOUNT NO:   [de-identified]                           ADMIT DATE: 2021. PROVIDER:     Bailey Xiong MD                  DISCHARGE DATE:    ADDENDUM    The patient began to experience a temp of 100.4 at 03:30 a.m. on  2021. Subsequently, she spiked to 103 at 1800. Blood cultures  were obtained. Repeat CBC revealed a white count of 9.9, hemoglobin  7.6, platelets 393,091. Urine culture so far is negative. The fever is  likely secondary to COVID. She is denying any chest pain or shortness  of breath. No nausea or vomiting. Passing flatus. No dysuria. Minimal lochia. Pain is under adequate control. O2 sat 97-98% on room  air. Heart rate 98-87. Blood pressure 110-129/54-74. Lungs:  Clear to  auscultation. CV:  Tachycardic, regular rhythm. Abdomen:  Soft,  nondistended, appropriately tender. No rebound or guarding. Fundus is  firm and two fingerbreadths below the umbilicus. Incision dry and  intact. Perineum is dry and intact. Extremities:  No clubbing,  cyanosis, edema. No cords or erythema. ASSESSMENT:  The patient with elevated temperature, postop. Likely  secondary to COVID. PLAN:   will go ahead and obtain infectious disease consult to assess  for IV immunoglobulin. Thereafter, home once stable with previously  indicated precautions.         Josh Licona MD    D: 2021 19:44:41       T: 2021 19:47:01     [email or web address]  Job#: [id number]     Doc#: [id number]    CC:

## 2021-12-29 NOTE — PROGRESS NOTES
[address] [place] [address] [facility]  Progress Note  CC: [id number]   Face to face encounter   SUBJECTIVE:  Patient is tolerating medications. No reported adverse drug reactions. ROS :No nausea, vomiting, diarrhea. No fevers today   Sitting up in bed - feeling better today. On room air. Medications:  Scheduled Meds:   sodium chloride flush  10 mL IntraVENous 2 times per day    acetaminophen  1,000 mg Oral Q6H    docusate sodium  100 mg Oral BID    ferrous sulfate  325 mg Oral TID WC    prenatal vitamin  1 tablet Oral Daily    measles, mumps & rubella vaccine  0.5 mL SubCUTAneous Prior to discharge    Tdap-Dtap  0.5 mL IntraMUSCular Prior to discharge    metoclopramide  10 mg Oral 4x Daily AC & HS     Continuous Infusions:   lactated ringers Stopped (12/27/21 0603)    sodium chloride      oxytocin Stopped (12/27/21 0603)     PRN Meds:sodium chloride flush, sodium chloride, carboprost, methylergonovine, oxyCODONE **OR** oxyCODONE, naloxone, ondansetron, diphenhydrAMINE, lanolin  OBJECTIVE:  Patient Vitals for the past 24 hrs:   BP Temp Temp src Pulse Resp SpO2   12/29/21 1237 (!) 113/54 97.7 °F (36.5 °C) Oral 67 16 --   12/29/21 0908 (!) 111/59 97.5 °F (36.4 °C) Oral 77 16 --   12/29/21 0557 (!) 100/55 97.7 °F (36.5 °C) Oral 66 15 --   12/29/21 0004 (!) 96/53 98.3 °F (36.8 °C) Oral 75 16 98 %   12/28/21 2117 (!) 101/51 98.1 °F (36.7 °C) Oral 87 16 97 %   12/28/21 1838 -- 102.3 °F (39.1 °C) Oral -- -- --   12/28/21 1802 -- 103 °F (39.4 °C) Oral -- -- --   12/28/21 1600 (!) 110/54 98.2 °F (36.8 °C) Oral 87 16 97 %     Constitutional: The patient is awake, alert, and oriented. Sitting up in bed. Skin: Warm and dry. No rashes were noted. Head: Eyes show round, and reactive pupils. No jaundice. Mouth: Moist mucous membranes, no ulcerations, no thrush. Neck: Supple to movements. No lymphadenopathy. Chest: No use of accessory muscles to breathe. Symmetrical expansion.  Auscultation reveals no APRN - CNS  2:53 PM  12/29/2021   PT WAS D/C BEFORE ROUNDS  PLANS WERE DISCUSSED  PT TO QUARANTINE  TO RETURN IF BREATHING WORSE  F/U PRN   Kimberly Mcgraw MD  '

## 2021-12-29 NOTE — PROGRESS NOTES
[address] [place] [address] [facility]  Progress Note    SUBJECTIVE:  Patient is tolerating medications. No reported adverse drug reactions. No nausea, vomiting, diarrhea. Medications:  Scheduled Meds:   sodium chloride flush  10 mL IntraVENous 2 times per day    acetaminophen  1,000 mg Oral Q6H    docusate sodium  100 mg Oral BID    ferrous sulfate  325 mg Oral TID WC    prenatal vitamin  1 tablet Oral Daily    measles, mumps & rubella vaccine  0.5 mL SubCUTAneous Prior to discharge    Tdap-Dtap  0.5 mL IntraMUSCular Prior to discharge    metoclopramide  10 mg Oral 4x Daily AC & HS     Continuous Infusions:   lactated ringers Stopped (12/27/21 0603)    sodium chloride      oxytocin Stopped (12/27/21 0603)     PRN Meds:sodium chloride flush, sodium chloride, carboprost, methylergonovine, oxyCODONE **OR** oxyCODONE, naloxone, ondansetron, diphenhydrAMINE, lanolin  OBJECTIVE:  Patient Vitals for the past 24 hrs:   BP Temp Temp src Pulse Resp SpO2   12/29/21 1237 (!) 113/54 97.7 °F (36.5 °C) Oral 67 16    12/29/21 0908 (!) 111/59 97.5 °F (36.4 °C) Oral 77 16    12/29/21 0557 (!) 100/55 97.7 °F (36.5 °C) Oral 66 15    12/29/21 0004 (!) 96/53 98.3 °F (36.8 °C) Oral 75 16 98 %   12/28/21 2117 (!) 101/51 98.1 °F (36.7 °C) Oral 87 16 97 %   12/28/21 1838  102.3 °F (39.1 °C) Oral      12/28/21 1802  103 °F (39.4 °C) Oral      12/28/21 1600 (!) 110/54 98.2 °F (36.8 °C) Oral 87 16 97 %   12/28/21 1410  98.8 °F (37.1 °C) Oral        Constitutional: The patient is awake, alert, and oriented. Skin: Warm and dry. No rashes were noted. Head: Eyes show round, and reactive pupils. No jaundice. Mouth: Moist mucous membranes, no ulcerations, no thrush. Neck: Supple to movements. No lymphadenopathy. Chest: No use of accessory muscles to breathe. Symmetrical expansion. Auscultation reveals no wheezing, crackles, or rhonchi. Cardiovascular: S1 and S2 are rhythmic and regular.  No murmurs appreciated. Abdomen: Positive bowel sounds to auscultation. Benign to palpation. No masses felt. No hepatosplenomegaly. Extremities: No clubbing, no cyanosis, no edema.     Laboratory and Tests Review:  Lab Results   Component Value Date    WBC 9.9 12/28/2021    WBC 14.2 (H) 12/26/2021    WBC 13.6 (H) 12/13/2021    HGB 7.6 (L) 12/28/2021    HCT 24.4 (L) 12/28/2021    MCV 85.9 12/28/2021     12/28/2021     Lab Results   Component Value Date    NEUTROABS 12.86 (H) 12/11/2021    NEUTROABS 10.15 (H) 11/15/2021    NEUTROABS 7.52 (H) 01/09/2019     Lab Results   Component Value Date    CRP 4.7 (H) 12/29/2021    CRP 1.8 (H) 01/08/2019    CRP 7.3 (H) 01/01/2019     Lab Results   Component Value Date    SEDRATE 63 (H) 12/29/2021    SEDRATE 62 (H) 01/08/2019    SEDRATE 52 (H) 01/01/2019     Lab Results   Component Value Date    ALT 8 12/26/2021    AST 15 12/26/2021    ALKPHOS 191 (H) 12/26/2021    BILITOT 0.2 12/26/2021     Lab Results   Component Value Date     12/26/2021    K 4.3 12/26/2021    K 4.4 12/11/2021     12/26/2021    CO2 18 12/26/2021    BUN 10 12/26/2021    CREATININE 0.7 12/26/2021    GFRAA >60 12/26/2021    LABGLOM >60 12/26/2021    GLUCOSE 69 12/26/2021    PROT 7.6 12/26/2021    LABALBU 3.6 12/26/2021    CALCIUM 8.5 12/26/2021    BILITOT 0.2 12/26/2021    ALKPHOS 191 12/26/2021    AST 15 12/26/2021    ALT 8 12/26/2021     Radiology:      Microbiology:   No new cultures    ASSESSMENT:  · S/p Csection   · Leukocytosis/fevers viral syndrome   · covid + on RA check labs   · Substance abuse     PLAN:  · Continue  · Check cultures  · Monitor labs    ARACELIS Ram - CNS  1:57 PM  12/29/2021

## 2021-12-29 NOTE — PLAN OF CARE
Problem: Pain:  Goal: Pain level will decrease  Description: Pain level will decrease  Outcome: Met This Shift  Goal: Control of acute pain  Description: Control of acute pain  Outcome: Met This Shift     Problem: Airway Clearance - Ineffective  Goal: Achieve or maintain patent airway  Outcome: Met This Shift     Problem: Breathing Pattern - Ineffective  Goal: Ability to achieve and maintain a regular respiratory rate  Outcome: Met This Shift     Problem:  Body Temperature -  Risk of, Imbalanced  Goal: Ability to maintain a body temperature within defined limits  Outcome: Met This Shift  Goal: Will regain or maintain usual level of consciousness  Outcome: Met This Shift  Goal: Complications related to the disease process, condition or treatment will be avoided or minimized  Outcome: Met This Shift     Problem: Isolation Precautions - Risk of Spread of Infection  Goal: Prevent transmission of infection  Outcome: Met This Shift     Problem: Fatigue  Goal: Verbalize increase energy and improved vitality  Outcome: Met This Shift     Problem: Patient Education: Go to Patient Education Activity  Goal: Patient/Family Education  Outcome: Met This Shift

## 2021-12-29 NOTE — DISCHARGE SUMMARY
[address]                               DISCHARGE SUMMARY    PATIENT NAME: Gonzalo Steward                     :        2000  MED REC NO:   [id number]                            ROOM:       [facility]03  ACCOUNT NO:   [de-identified]                           ADMIT DATE: 11/15/2021. PROVIDER:     Janis May MD                  DISCHARGE DATE:    ADDENDUM    The patient is doing well. Her last temperature spike was at 18:38 on  . She saw Infectious Disease who evaluated her and   is amendable to discharge today. She is afebrile. Heart rate 67-77. O2 sat 98% on room air. Lungs:  Clear to auscultation. CV:  Regular  rate and rhythm. Abdomen:  Soft, nondistended, appropriately tender. No rebound or guarding. Normal bowel sounds are present. Fundus is  firm and two fingerbreadths below the umbilicus. Incision dry and  intact. Perineum dry and intact. Extremities:  No clubbing, cyanosis,  edema. No cords or erythema. ASSESSMENT:  The patient is doing well postop. Stable. Has had COVID,  but is tolerating the infection. PLAN:  Home with fever, bleeding, emboli, lifting, climbing, driving  precautions. She is to monitor her oxygen saturation as outpatient, and  return to ER if the oxygen saturation is 94%. She is to follow up at  the office in 6 weeks. She indicates understanding of all instructions.         Kerry Alicia MD    D: 2021 15:45:23       T: 2021 15:48:55     [email or web address]  Job#: [id number]     Doc#: [id number]    CC:

## 2022-01-03 ENCOUNTER — HOSPITAL ENCOUNTER (EMERGENCY)
Age: 22
Discharge: HOME | End: 2022-01-03
Payer: MEDICAID

## 2022-01-03 ENCOUNTER — APPOINTMENT (OUTPATIENT)
Dept: ULTRASOUND IMAGING | Age: 22
End: 2022-01-03
Payer: MEDICAID

## 2022-01-03 VITALS
SYSTOLIC BLOOD PRESSURE: 122 MMHG | HEART RATE: 90 BPM | DIASTOLIC BLOOD PRESSURE: 58 MMHG | OXYGEN SATURATION: 97 % | TEMPERATURE: 97.8 F | RESPIRATION RATE: 18 BRPM

## 2022-01-03 DIAGNOSIS — I82.619 CEPHALIC VEIN THROMBOSIS: Primary | ICD-10-CM

## 2022-01-03 DIAGNOSIS — L03.90 CELLULITIS, UNSPECIFIED CELLULITIS SITE: ICD-10-CM

## 2022-01-03 PROCEDURE — 99282 EMERGENCY DEPT VISIT SF MDM: CPT

## 2022-01-03 PROCEDURE — 93971 EXTREMITY STUDY: CPT

## 2022-01-03 RX ORDER — CEPHALEXIN 500 MG/1
500 CAPSULE ORAL 4 TIMES DAILY
Qty: 28 CAPSULE | Refills: 0 | Status: SHIPPED | OUTPATIENT
Start: 2022-01-03 | End: 2022-01-10

## 2022-01-03 RX ORDER — NAPROXEN 500 MG/1
500 TABLET ORAL 2 TIMES DAILY PRN
Qty: 28 TABLET | Refills: 0 | Status: SHIPPED | OUTPATIENT
Start: 2022-01-03 | End: 2022-02-07

## 2022-01-03 ASSESSMENT — ENCOUNTER SYMPTOMS
COLOR CHANGE: 1
VOMITING: 0
CHEST TIGHTNESS: 0
SHORTNESS OF BREATH: 0
NAUSEA: 0
COUGH: 0
BACK PAIN: 0
DIARRHEA: 0
ABDOMINAL PAIN: 0
CONSTIPATION: 0

## 2022-01-03 NOTE — ED NOTES
FIRST PROVIDER CONTACT ASSESSMENT NOTE           Department of Emergency Medicine                 First Provider Note            1/3/22  3:43 PM EST    Date of Encounter: No admission date for patient encounter. Patient Name: Christina Hurst  : 2000  MRN: [id number]    Chief Complaint: No chief complaint on file. History of Present Illness:   Christina Hurst is a 22 y.o. female who presents to the ED for right forearm pain, swelling that started yesterday, recent IV in the arm. Focused Physical Exam:  VS:    ED Triage Vitals [22 1324]   BP Temp Temp src Pulse Resp SpO2 Height Weight   -- -- -- 106 -- 98 % -- --        Physical Ex: Constitutional: Alert and non-toxic. Medical History:  has no past medical history on file. Surgical History:  has a past surgical history that includes pr office/outpt visit,procedure only (N/A, 2018); Cervical Cerclage;  section (N/A, 2019); and  section (N/A, 2021). Social History:  reports that she has been smoking. She has a 1.50 pack-year smoking history. She has never used smokeless tobacco. She reports that she does not drink alcohol and does not use drugs. Family History: family history includes Asthma in her mother; Colon Cancer in her mother; Heart Attack in her maternal grandfather.     Allergies: Seasonal     Initial Plan of Care: Initiate Treatment-Testing, Proceed toTreatment Area When Bed Available for ED Attending/MLP to Continue Care      ---END OF FIRST PROVIDER CONTACT ASSESSMENT NOTE---  Electronically signed by COLLEEN Tolbert   DD: 1/3/22       COLLEEN Tolbert  22 1548

## 2022-01-03 NOTE — ED PROVIDER NOTES
[facility]        Department of Emergency Medicine   ED  Provider Note  Admit Date/RoomTime: 1/3/2022  5:32 PM  ED Room: [address]  HPI:  1/3/22, Time: 5:44 PM EST      The patient is a 23 yo female presenting to the ED with pain, swelling and redness to the RT forearm for the last 3 days. Pt states it started in the hospital after giving birth where she had an IV placed. She is concerned for a blood clot. Pt is also positive for COVID. She has never had a blood clot before. She denies any CP, SOB, fevers/chills, numbness or tingling in the arm. The history is provided by the patient. No  was used. REVIEW OF SYSTEMS:  Review of Systems   Constitutional: Negative for activity change, chills, fatigue and fever. Respiratory: Negative for cough, chest tightness and shortness of breath. Cardiovascular: Negative for chest pain, palpitations and leg swelling. Gastrointestinal: Negative for abdominal pain, constipation, diarrhea, nausea and vomiting. Musculoskeletal: Positive for joint swelling. Negative for back pain, neck pain and neck stiffness. Skin: Positive for color change. Negative for pallor and rash. Neurological: Negative for dizziness, weakness, light-headedness and headaches. Psychiatric/Behavioral: Negative for agitation, behavioral problems and confusion. Pertinent positives and negatives are stated within HPI, all other systems reviewed and are negative.      --------------------------------------------- PAST HISTORY ---------------------------------------------  Past Medical History:  has no past medical history on file. Past Surgical History:  has a past surgical history that includes pr office/outpt visit,procedure only (N/A, 2018); Cervical Cerclage;  section (N/A, 2019); and  section (N/A, 2021). Social History:  reports that she has been smoking. She has a 1.50 pack-year smoking history.  She has never used smokeless tobacco. She reports that she does not drink alcohol and does not use drugs. Family History: family history includes Asthma in her mother; Colon Cancer in her mother; Heart Attack in her maternal grandfather. The patients home medications have been reviewed. Allergies: Seasonal    -------------------------------------------------- RESULTS -------------------------------------------------  All laboratory and radiology results have been personally reviewed by myself   LABS:  No results found for this visit on 01/03/22. RADIOLOGY:  Interpreted by RadiologistSouleymane JONES UPPER EXTREMITY RIGHT VENOUS   Final Result   Superficial venous thrombosis in the cephalic vein within the forearm. Deep   venous structures of the upper right arm appear widely patent. RECOMMENDATIONS:   Unavailable             ------------------------- NURSING NOTES AND VITALS REVIEWED ---------------------------   The nursing notes within the ED encounter and vital signs as below have been reviewed. BP (!) 122/58   Pulse 90   Temp 97.8 °F (36.6 °C) (Oral)   Resp 18   SpO2 97%   Oxygen Saturation Interpretation: Normal      ---------------------------------------------------PHYSICAL EXAM--------------------------------------    Physical Exam  Vitals and nursing note reviewed. Constitutional:       General: She is not in acute distress. Appearance: Normal appearance. She is well-developed. She is not ill-appearing. HENT:      Head: Normocephalic and atraumatic. Mouth/Throat:      Mouth: Mucous membranes are moist.      Pharynx: Oropharynx is clear. Neck:      Vascular: No JVD. Trachea: No tracheal deviation. Cardiovascular:      Rate and Rhythm: Normal rate and regular rhythm. Heart sounds: Normal heart sounds. No murmur heard. Pulmonary:      Effort: Pulmonary effort is normal. No respiratory distress. Breath sounds: Normal breath sounds.    Musculoskeletal:         General: No deformity. Normal range of motion. Cervical back: Normal range of motion and neck supple. Comments: TTP and edema with erythema and warmth to RT mid forearm. From of all digits with normal cap refill. 2+ radial pulse. Skin:     General: Skin is warm and dry. Capillary Refill: Capillary refill takes less than 2 seconds. Coloration: Skin is not pale. Findings: No erythema. Neurological:      Mental Status: She is alert and oriented to person, place, and time. Mental status is at baseline. Motor: No weakness. Psychiatric:         Mood and Affect: Mood normal.         Behavior: Behavior normal.         Thought Content: Thought content normal.            ------------------------------ ED COURSE/MEDICAL DECISION MAKING----------------------  Medications - No data to display      ED COURSE:      US DUP UPPER EXTREMITY RIGHT VENOUS   Final Result   Superficial venous thrombosis in the cephalic vein within the forearm. Deep   venous structures of the upper right arm appear widely patent. RECOMMENDATIONS:   Unavailable               Procedures:  Procedures     Medical Decision Making:   MDM   21 yo female presenting with RT forearm pain, swelling, redness and warmth after having an IV. Symptoms started 3 days ago. Pt just delivered a child 1 week ago. She is also covid +. ROS otherwise negative. US is + for superificial clot in cephalic vein but negative for DVT. Pt likely has superficial thrombophlebitis with early mild cellulitis. She is educated on warm compressed and given naproxen and keflex. She is to FU with PCP in 1 week or return with any new or worsening symptoms. Counseling: The emergency provider has spoken with the patient and discussed todays results, in addition to providing specific details for the plan of care and counseling regarding the diagnosis and prognosis.   Questions are answered at this time and they are agreeable with the plan.      --------------------------------- IMPRESSION AND DISPOSITION ---------------------------------    IMPRESSION  1. Cephalic vein thrombosis    2. Cellulitis, unspecified cellulitis site        DISPOSITION  Disposition: Discharge to home  Patient condition is good      Electronically signed by Danna Shultz PA-C   DD: 1/3/22  **This report was transcribed using voice recognition software. Every effort was made to ensure accuracy; however, inadvertent computerized transcription errors may be present.   END OF ED PROVIDER NOTE         Danna Shultz PA-C  01/03/22 3280

## 2022-02-07 ENCOUNTER — HOSPITAL ENCOUNTER (EMERGENCY)
Age: 22
Discharge: HOME | End: 2022-02-07
Attending: EMERGENCY MEDICINE
Payer: MEDICAID

## 2022-02-07 VITALS
HEIGHT: 63 IN | RESPIRATION RATE: 16 BRPM | DIASTOLIC BLOOD PRESSURE: 69 MMHG | SYSTOLIC BLOOD PRESSURE: 113 MMHG | WEIGHT: 145 LBS | BODY MASS INDEX: 25.69 KG/M2 | OXYGEN SATURATION: 99 % | TEMPERATURE: 97.7 F | HEART RATE: 70 BPM

## 2022-02-07 DIAGNOSIS — K04.7 DENTAL INFECTION: Primary | ICD-10-CM

## 2022-02-07 PROCEDURE — 99283 EMERGENCY DEPT VISIT LOW MDM: CPT

## 2022-02-07 RX ORDER — IBUPROFEN 600 MG/1
600 TABLET ORAL EVERY 8 HOURS PRN
Qty: 30 TABLET | Refills: 0 | Status: SHIPPED | OUTPATIENT
Start: 2022-02-07 | End: 2022-07-24

## 2022-02-07 RX ORDER — CLINDAMYCIN HYDROCHLORIDE 150 MG/1
150 CAPSULE ORAL 4 TIMES DAILY
Qty: 40 CAPSULE | Refills: 0 | Status: SHIPPED | OUTPATIENT
Start: 2022-02-07 | End: 2022-02-17

## 2022-02-07 ASSESSMENT — ENCOUNTER SYMPTOMS
EYE DISCHARGE: 0
SHORTNESS OF BREATH: 0
WHEEZING: 0
DIARRHEA: 0
EYE REDNESS: 0
COUGH: 0
EYE PAIN: 0
SINUS PRESSURE: 0
SORE THROAT: 0
VOMITING: 0
BACK PAIN: 0
NAUSEA: 0
ABDOMINAL DISTENTION: 0

## 2022-02-07 ASSESSMENT — PAIN DESCRIPTION - PROGRESSION
CLINICAL_PROGRESSION: NOT CHANGED
CLINICAL_PROGRESSION: NOT CHANGED

## 2022-02-07 ASSESSMENT — PAIN DESCRIPTION - FREQUENCY: FREQUENCY: CONTINUOUS

## 2022-02-07 ASSESSMENT — PAIN DESCRIPTION - ORIENTATION: ORIENTATION: RIGHT;LOWER

## 2022-02-07 ASSESSMENT — PAIN DESCRIPTION - PAIN TYPE: TYPE: ACUTE PAIN

## 2022-02-07 ASSESSMENT — PAIN DESCRIPTION - DESCRIPTORS: DESCRIPTORS: THROBBING

## 2022-02-07 ASSESSMENT — PAIN DESCRIPTION - LOCATION: LOCATION: JAW

## 2022-02-07 ASSESSMENT — PAIN SCALES - GENERAL: PAINLEVEL_OUTOF10: 8

## 2022-07-24 ENCOUNTER — HOSPITAL ENCOUNTER (EMERGENCY)
Age: 22
Discharge: HOME | End: 2022-07-24
Attending: EMERGENCY MEDICINE
Payer: MEDICAID

## 2022-07-24 ENCOUNTER — HOSPITAL ENCOUNTER (EMERGENCY)
Age: 22
Discharge: HOME | End: 2022-07-24
Payer: MEDICAID

## 2022-07-24 VITALS
OXYGEN SATURATION: 99 % | BODY MASS INDEX: 25.69 KG/M2 | HEIGHT: 63 IN | SYSTOLIC BLOOD PRESSURE: 105 MMHG | DIASTOLIC BLOOD PRESSURE: 73 MMHG | RESPIRATION RATE: 20 BRPM | WEIGHT: 145 LBS | HEART RATE: 92 BPM | TEMPERATURE: 99.3 F

## 2022-07-24 VITALS
TEMPERATURE: 97.5 F | RESPIRATION RATE: 18 BRPM | OXYGEN SATURATION: 100 % | DIASTOLIC BLOOD PRESSURE: 74 MMHG | SYSTOLIC BLOOD PRESSURE: 114 MMHG | WEIGHT: 145 LBS | HEART RATE: 97 BPM | BODY MASS INDEX: 25.69 KG/M2

## 2022-07-24 DIAGNOSIS — J02.9 EXUDATIVE PHARYNGITIS: Primary | ICD-10-CM

## 2022-07-24 DIAGNOSIS — B34.9 ACUTE VIRAL SYNDROME: ICD-10-CM

## 2022-07-24 DIAGNOSIS — J02.9 VIRAL PHARYNGITIS: Primary | ICD-10-CM

## 2022-07-24 LAB
MONO TEST: NEGATIVE
STREP GRP A PCR: NEGATIVE

## 2022-07-24 PROCEDURE — 86308 HETEROPHILE ANTIBODY SCREEN: CPT

## 2022-07-24 PROCEDURE — 99283 EMERGENCY DEPT VISIT LOW MDM: CPT

## 2022-07-24 PROCEDURE — 36415 COLL VENOUS BLD VENIPUNCTURE: CPT

## 2022-07-24 PROCEDURE — 6370000000 HC RX 637 (ALT 250 FOR IP): Performed by: PHYSICIAN ASSISTANT

## 2022-07-24 PROCEDURE — 6360000002 HC RX W HCPCS: Performed by: PHYSICIAN ASSISTANT

## 2022-07-24 PROCEDURE — 99211 OFF/OP EST MAY X REQ PHY/QHP: CPT

## 2022-07-24 PROCEDURE — 87880 STREP A ASSAY W/OPTIC: CPT

## 2022-07-24 RX ORDER — AMOXICILLIN 250 MG/1
500 CAPSULE ORAL ONCE
Status: COMPLETED | OUTPATIENT
Start: 2022-07-24 | End: 2022-07-24

## 2022-07-24 RX ORDER — AMOXICILLIN 500 MG/1
500 CAPSULE ORAL 2 TIMES DAILY
Qty: 20 CAPSULE | Refills: 0 | Status: SHIPPED | OUTPATIENT
Start: 2022-07-24 | End: 2022-08-03

## 2022-07-24 RX ORDER — DEXAMETHASONE SODIUM PHOSPHATE 10 MG/ML
10 INJECTION, SOLUTION INTRAMUSCULAR; INTRAVENOUS ONCE
Status: COMPLETED | OUTPATIENT
Start: 2022-07-24 | End: 2022-07-24

## 2022-07-24 RX ORDER — IBUPROFEN 600 MG/1
600 TABLET ORAL EVERY 8 HOURS PRN
Qty: 20 TABLET | Refills: 0 | Status: SHIPPED | OUTPATIENT
Start: 2022-07-24 | End: 2022-08-03

## 2022-07-24 RX ADMIN — DEXAMETHASONE SODIUM PHOSPHATE 10 MG: 10 INJECTION, SOLUTION INTRAMUSCULAR; INTRAVENOUS at 18:15

## 2022-07-24 RX ADMIN — AMOXICILLIN 500 MG: 250 CAPSULE ORAL at 18:57

## 2022-07-24 ASSESSMENT — ENCOUNTER SYMPTOMS
WHEEZING: 0
VOMITING: 0
SORE THROAT: 1
SHORTNESS OF BREATH: 0
SINUS PRESSURE: 0
DIARRHEA: 0
NAUSEA: 0
BACK PAIN: 0
COUGH: 0
EYE DISCHARGE: 0
ABDOMINAL DISTENTION: 0
EYE REDNESS: 0
EYE PAIN: 0

## 2022-07-24 ASSESSMENT — PAIN - FUNCTIONAL ASSESSMENT
PAIN_FUNCTIONAL_ASSESSMENT: 0-10
PAIN_FUNCTIONAL_ASSESSMENT: NONE - DENIES PAIN

## 2022-07-24 ASSESSMENT — PAIN DESCRIPTION - DESCRIPTORS: DESCRIPTORS: SORE

## 2022-07-24 ASSESSMENT — PAIN SCALES - GENERAL: PAINLEVEL_OUTOF10: 8

## 2022-07-24 ASSESSMENT — PAIN DESCRIPTION - LOCATION: LOCATION: THROAT

## 2022-07-24 ASSESSMENT — PAIN DESCRIPTION - ONSET: ONSET: GRADUAL

## 2022-07-24 ASSESSMENT — PAIN DESCRIPTION - PAIN TYPE: TYPE: ACUTE PAIN

## 2022-07-24 NOTE — Clinical Note
Mary Massey was seen and treated in our emergency department on 7/24/2022. She may return to work on 07/26/2022. If you have any questions or concerns, please don't hesitate to call.       Bette Hansen PA-C

## 2022-07-24 NOTE — ED PROVIDER NOTES
The history is provided by the patient. Illness   The current episode started 3 to 5 days ago. The onset was gradual. The problem occurs occasionally. The problem has been unchanged. The problem is mild. Nothing relieves the symptoms. Nothing aggravates the symptoms. Associated symptoms include a fever and sore throat. Pertinent negatives include no diarrhea, no nausea, no vomiting, no ear pain, no headaches, no cough, no wheezing, no rash, no eye discharge, no eye pain and no eye redness. She has been Less active. She has been Eating less than usual. Urine output has been normal. The last void occurred Less than 6 hours ago. There were no sick contacts. She has received no recent medical care. Review of Systems   Constitutional:  Positive for activity change, appetite change, chills and fever. HENT:  Positive for sore throat. Negative for ear pain and sinus pressure. Eyes:  Negative for pain, discharge and redness. Respiratory:  Negative for cough, shortness of breath and wheezing. Cardiovascular:  Negative for chest pain. Gastrointestinal:  Negative for abdominal distention, diarrhea, nausea and vomiting. Genitourinary:  Negative for dysuria and frequency. Musculoskeletal:  Negative for arthralgias and back pain. Skin:  Negative for rash and wound. Neurological:  Negative for weakness and headaches. Hematological:  Negative for adenopathy. Psychiatric/Behavioral: Negative. All other systems reviewed and are negative. Physical Exam  Vitals and nursing note reviewed. Constitutional:       Appearance: She is well-developed. HENT:      Head: Normocephalic and atraumatic. Right Ear: Tympanic membrane normal.      Left Ear: Tympanic membrane normal.      Mouth/Throat:      Pharynx: Uvula midline. Posterior oropharyngeal erythema present. Eyes:      Pupils: Pupils are equal, round, and reactive to light. Cardiovascular:      Rate and Rhythm: Regular rhythm. Tachycardia present. Heart sounds: Normal heart sounds. No murmur heard. Pulmonary:      Effort: Pulmonary effort is normal.      Breath sounds: Normal breath sounds. Abdominal:      General: Bowel sounds are normal.      Palpations: Abdomen is soft. Tenderness: There is no abdominal tenderness. There is no guarding or rebound. Musculoskeletal:      Cervical back: Normal range of motion and neck supple. Skin:     General: Skin is warm and dry. Neurological:      Mental Status: She is alert and oriented to person, place, and time. Psychiatric:         Behavior: Behavior normal.         Thought Content: Thought content normal.         Judgment: Judgment normal.        Procedures     MDM     --------------------------------------------- PAST HISTORY ---------------------------------------------  Past Medical History:  has no past medical history on file. Past Surgical History:  has a past surgical history that includes pr office/outpt visit,procedure only (N/A, 2018); Cervical Cerclage;  section (N/A, 2019); and  section (N/A, 2021). Social History:  reports that she has been smoking. She has a 1.50 pack-year smoking history. She has never used smokeless tobacco. She reports that she does not drink alcohol and does not use drugs. Family History: family history includes Asthma in her mother; Colon Cancer in her mother; Heart Attack in her maternal grandfather. The patients home medications have been reviewed.     Allergies: Seasonal    -------------------------------------------------- RESULTS -------------------------------------------------  Results for orders placed or performed during the hospital encounter of 22   Strep Screen Group A Throat    Specimen: Throat   Result Value Ref Range    Strep Grp A PCR Negative Negative     No orders to display       ------------------------- NURSING NOTES AND VITALS REVIEWED ---------------------------   The nursing notes within the ED encounter and vital signs as below have been reviewed. /74   Pulse 97   Temp 97.5 °F (36.4 °C) (Temporal)   Resp 18   Wt 145 lb (65.8 kg)   LMP 07/05/2022   SpO2 100%   BMI 25.69 kg/m²   Oxygen Saturation Interpretation: Normal      ------------------------------------------ PROGRESS NOTES ------------------------------------------   I have spoken with the patient and discussed todays results, in addition to providing specific details for the plan of care and counseling regarding the diagnosis and prognosis. Their questions are answered at this time and they are agreeable with the plan.      --------------------------------- ADDITIONAL PROVIDER NOTES ---------------------------------        This patient is stable for discharge. I have shared the specific conditions for return, as well as the importance of follow-up. IMPRESSION:     1. Viral pharyngitis    2.  Acute viral syndrome      Patient's Medications   New Prescriptions    IBUPROFEN (IBU) 600 MG TABLET    Take 1 tablet by mouth every 8 hours as needed for Pain   Previous Medications    No medications on file   Modified Medications    No medications on file   Discontinued Medications    IBUPROFEN (IBU) 600 MG TABLET    Take 1 tablet by mouth every 8 hours as needed for Pain                   Janna Maria DO  07/24/22 0628

## 2022-07-24 NOTE — ED PROVIDER NOTES
[address] [facility]  Department of Emergency Medicine  UC Encounter Note  22   6:07 PM EDT      NAME: Ulises Silveira  :  2000  MRN:  [id number]    Chief Complaint: Pharyngitis, Fever, Otalgia (start), and Fatigue (Started 2 days ago with fever chills sore throat earache   body aches  was tested strep  covid neg)      This is a 29-year-old female the presents to urgent care complaining of a sore throat and some URI symptoms she also states she has had chills and fever and some body aches. She states that she was at a another facility today and the strep screen was negative. She states that she took a home COVID test today it was negative. Patient states she still having symptoms and came here for further evaluation. On first contact patient she appears to be in no acute distress. Review of Systems  Pertinent positives and negatives are stated within HPI, all other systems reviewed and are negative. Physical Exam  Vitals and nursing note reviewed. Constitutional:       Appearance: She is well-developed. HENT:      Head: Normocephalic and atraumatic. Jaw: There is normal jaw occlusion. No trismus, tenderness or swelling. Right Ear: Hearing, tympanic membrane, ear canal and external ear normal.      Left Ear: Hearing, tympanic membrane, ear canal and external ear normal.      Nose: Nose normal. No congestion or rhinorrhea. Right Sinus: No maxillary sinus tenderness or frontal sinus tenderness. Left Sinus: No maxillary sinus tenderness or frontal sinus tenderness. Mouth/Throat:      Mouth: Mucous membranes are moist. No angioedema. Pharynx: Uvula midline. Pharyngeal swelling, oropharyngeal exudate and posterior oropharyngeal erythema present. No uvula swelling. Tonsils: No tonsillar abscesses. Comments: Oropharynx is patent.   Eyes:      General: Lids are normal.      Conjunctiva/sclera: Conjunctivae normal.      Pupils: Pupils are equal, round, and reactive to light. Cardiovascular:      Rate and Rhythm: Normal rate and regular rhythm. Heart sounds: Normal heart sounds. No murmur heard. Pulmonary:      Effort: Pulmonary effort is normal.      Breath sounds: Normal breath sounds. Abdominal:      General: Bowel sounds are normal.      Palpations: Abdomen is soft. Abdomen is not rigid. Tenderness: There is no abdominal tenderness. There is no guarding or rebound. Musculoskeletal:      Cervical back: Full passive range of motion without pain, normal range of motion and neck supple. Skin:     General: Skin is warm and dry. Findings: No abrasion or rash. Neurological:      General: No focal deficit present. Mental Status: She is alert and oriented to person, place, and time. GCS: GCS eye subscore is 4. GCS verbal subscore is 5. GCS motor subscore is 6. Cranial Nerves: No cranial nerve deficit. Sensory: No sensory deficit. Coordination: Coordination normal.      Gait: Gait normal.       Procedures    MDM  Number of Diagnoses or Management Options  Exudative pharyngitis  Diagnosis management comments: Patient is in no acute distress. Her monotest was negative. She does have exudative pharyngitis I did tell the patient that she could just have a virus causing this but this could be a another type of bacterial infection as well so I will place her on an antibiotic just in case. Instructions given.             --------------------------------------------- PAST HISTORY ---------------------------------------------  Past Medical History:  has no past medical history on file. Past Surgical History:  has a past surgical history that includes pr office/outpt visit,procedure only (N/A, 2018); Cervical Cerclage;  section (N/A, 2019); and  section (N/A, 2021). Social History:  reports that she has been smoking. She has a 1.50 pack-year smoking history.  She has never used smokeless tobacco. She reports that she does not drink alcohol and does not use drugs. Family History: family history includes Asthma in her mother; Colon Cancer in her mother; Heart Attack in her maternal grandfather. The patients home medications have been reviewed. Allergies: Seasonal    -------------------------------------------------- RESULTS -------------------------------------------------  Results for orders placed or performed during the hospital encounter of 07/24/22   Mononucleosis Screen   Result Value Ref Range    Mono Test Negative Negative     No orders to display       ------------------------- NURSING NOTES AND VITALS REVIEWED ---------------------------   The nursing notes within the ED encounter and vital signs as below have been reviewed. /73   Pulse 92   Temp 99.3 °F (37.4 °C) (Infrared)   Resp 20   Ht 5' 3\" (1.6 m)   Wt 145 lb (65.8 kg)   LMP 07/05/2022   SpO2 99%   BMI 25.69 kg/m²   Oxygen Saturation Interpretation: Normal      ------------------------------------------ PROGRESS NOTES ------------------------------------------   I have spoken with the patient and discussed todays results, in addition to providing specific details for the plan of care and counseling regarding the diagnosis and prognosis. Their questions are answered at this time and they are agreeable with the plan.      --------------------------------- ADDITIONAL PROVIDER NOTES ---------------------------------     This patient is stable for discharge. I have shared the specific conditions for return, as well as the importance of follow-up. * NOTE: This report was transcribed using voice recognition software. Every effort was made to ensure accuracy; however, inadvertent computerized transcription errors may be present.    --------------------------------- IMPRESSION AND DISPOSITION ---------------------------------    IMPRESSION  1.  Exudative pharyngitis        DISPOSITION  Disposition: Discharge to home  Patient condition is good       Maye Segundo PA-C  07/24/22 1939

## 2023-07-13 NOTE — ED PROVIDER NOTES
The history is provided by the patient. Dental Pain  Location:  Lower  Lower teeth location:  17/LL 3rd molar and 18/LL 2nd molar  Quality:  Aching  Severity:  Moderate  Onset quality:  Gradual  Duration:  4 days  Chronicity:  New  Context: abscess and dental caries    Associated symptoms: no fever and no headaches         Review of Systems   Constitutional: Negative for chills and fever. HENT: Negative for ear pain, sinus pressure and sore throat. Eyes: Negative for pain, discharge and redness. Respiratory: Negative for cough, shortness of breath and wheezing. Cardiovascular: Negative for chest pain. Gastrointestinal: Negative for abdominal distention, diarrhea, nausea and vomiting. Genitourinary: Negative for dysuria and frequency. Musculoskeletal: Negative for arthralgias and back pain. Skin: Negative for rash and wound. Neurological: Negative for weakness and headaches. Hematological: Negative for adenopathy. All other systems reviewed and are negative. Physical Exam  Vitals and nursing note reviewed. Constitutional:       Appearance: She is well-developed. HENT:      Head: Normocephalic and atraumatic. Right Ear: Hearing, tympanic membrane and external ear normal.      Left Ear: Hearing, tympanic membrane and external ear normal.      Nose: Nose normal.      Mouth/Throat:      Dentition: Gingival swelling, dental caries and dental abscesses present. Pharynx: Uvula midline. Eyes:      General: Lids are normal.      Conjunctiva/sclera: Conjunctivae normal.      Pupils: Pupils are equal, round, and reactive to light. Cardiovascular:      Rate and Rhythm: Normal rate and regular rhythm. Heart sounds: Normal heart sounds. No murmur heard. Pulmonary:      Effort: Pulmonary effort is normal. No respiratory distress. Breath sounds: Normal breath sounds. No wheezing or rales.    Abdominal:      General: Bowel sounds are normal.      Palpations: Abdomen is soft. Abdomen is not rigid. Tenderness: There is no abdominal tenderness. There is no guarding or rebound. Musculoskeletal:      Cervical back: Normal range of motion and neck supple. Skin:     General: Skin is warm and dry. Findings: No abrasion or rash. Neurological:      Mental Status: She is alert and oriented to person, place, and time. GCS: GCS eye subscore is 4. GCS verbal subscore is 5. GCS motor subscore is 6. Cranial Nerves: No cranial nerve deficit. Sensory: No sensory deficit. Coordination: Coordination normal.      Gait: Gait normal.          Procedures     MDM          --------------------------------------------- PAST HISTORY ---------------------------------------------  Past Medical History:  has no past medical history on file. Past Surgical History:  has a past surgical history that includes pr office/outpt visit,procedure only (N/A, 2018); Cervical Cerclage;  section (N/A, 2019); and  section (N/A, 2021). Social History:  reports that she has been smoking. She has a 1.50 pack-year smoking history. She has never used smokeless tobacco. She reports that she does not drink alcohol and does not use drugs. Family History: family history includes Asthma in her mother; Colon Cancer in her mother; Heart Attack in her maternal grandfather. The patients home medications have been reviewed. Allergies: Seasonal    -------------------------------------------------- RESULTS -------------------------------------------------  Labs:  No results found for this visit on 22. Radiology:  No orders to display       ------------------------- NURSING NOTES AND VITALS REVIEWED ---------------------------  Date / Time Roomed:  2022  3:44 PM  ED Bed Assignment:      The nursing notes within the ED encounter and vital signs as below have been reviewed.    /69   Pulse 70   Temp 97.7 °F (36.5 °C) (Temporal)   Resp 16   Ht 5' 3\" (1.6 m)   Wt 145 lb (65.8 kg)   LMP 02/02/2022   SpO2 99%   BMI 25.69 kg/m²   Oxygen Saturation Interpretation: Normal      ------------------------------------------ PROGRESS NOTES ------------------------------------------  I have spoken with the patient and discussed todays results, in addition to providing specific details for the plan of care and counseling regarding the diagnosis and prognosis. Their questions are answered at this time and they are agreeable with the plan. I discussed at length with them reasons for immediate return here for re evaluation. They will followup with primary care by calling their office tomorrow. --------------------------------- ADDITIONAL PROVIDER NOTES ---------------------------------  At this time the patient is without objective evidence of an acute process requiring hospitalization or inpatient management. They have remained hemodynamically stable throughout their entire ED visit and are stable for discharge with outpatient follow-up. The plan has been discussed in detail and they are aware of the specific conditions for emergent return, as well as the importance of follow-up. New Prescriptions    CLINDAMYCIN (CLEOCIN) 150 MG CAPSULE    Take 1 capsule by mouth 4 times daily for 10 days    IBUPROFEN (IBU) 600 MG TABLET    Take 1 tablet by mouth every 8 hours as needed for Pain       Diagnosis:  1. Dental infection        Disposition:  Patient's disposition: Discharge to home  Patient's condition is stable.                       Aquiles Grant MD  02/07/22 0441 Detail Level: Zone Initiate Treatment: clotrimazole-betamethasone 1 %-0.05 % topical cream \\nApply to lower abdomen for rash\\n\\nclindamycin phosphate 1 % topical solution \\nApply to affected areas twice daily on lower abdomen

## 2023-08-26 ENCOUNTER — HOSPITAL ENCOUNTER (EMERGENCY)
Age: 23
Discharge: HOME | End: 2023-08-27
Attending: EMERGENCY MEDICINE
Payer: MEDICAID

## 2023-08-26 VITALS
TEMPERATURE: 98.1 F | HEART RATE: 97 BPM | SYSTOLIC BLOOD PRESSURE: 124 MMHG | WEIGHT: 170 LBS | HEIGHT: 63 IN | BODY MASS INDEX: 30.12 KG/M2 | OXYGEN SATURATION: 98 % | RESPIRATION RATE: 20 BRPM | DIASTOLIC BLOOD PRESSURE: 63 MMHG

## 2023-08-26 DIAGNOSIS — B34.9 VIRAL SYNDROME: Primary | ICD-10-CM

## 2023-08-26 DIAGNOSIS — R11.2 NAUSEA AND VOMITING, UNSPECIFIED VOMITING TYPE: ICD-10-CM

## 2023-08-26 DIAGNOSIS — E86.0 DEHYDRATION: ICD-10-CM

## 2023-08-26 LAB
ALBUMIN SERPL-MCNC: 4.7 G/DL (ref 3.5–5.2)
ALP SERPL-CCNC: 75 U/L (ref 35–104)
ALT SERPL-CCNC: 13 U/L (ref 0–32)
ANION GAP SERPL CALCULATED.3IONS-SCNC: 15 MMOL/L (ref 7–16)
AST SERPL-CCNC: 21 U/L (ref 0–31)
BASOPHILS # BLD: 0.03 K/UL (ref 0–0.2)
BASOPHILS NFR BLD: 0 % (ref 0–2)
BILIRUB SERPL-MCNC: 0.4 MG/DL (ref 0–1.2)
BUN SERPL-MCNC: 14 MG/DL (ref 6–20)
CALCIUM SERPL-MCNC: 9.1 MG/DL (ref 8.6–10.2)
CHLORIDE SERPL-SCNC: 102 MMOL/L (ref 98–107)
CO2 SERPL-SCNC: 21 MMOL/L (ref 22–29)
CREAT SERPL-MCNC: 0.8 MG/DL (ref 0.5–1)
EOSINOPHIL # BLD: 0.02 K/UL (ref 0.05–0.5)
EOSINOPHILS RELATIVE PERCENT: 0 % (ref 0–6)
ERYTHROCYTE [DISTWIDTH] IN BLOOD BY AUTOMATED COUNT: 13.3 % (ref 11.5–15)
GFR SERPL CREATININE-BSD FRML MDRD: >60 ML/MIN/1.73M2
GLUCOSE SERPL-MCNC: 107 MG/DL (ref 74–99)
HCG, URINE, POC: NEGATIVE
HCT VFR BLD AUTO: 42.6 % (ref 34–48)
HGB BLD-MCNC: 14 G/DL (ref 11.5–15.5)
IMM GRANULOCYTES # BLD AUTO: 0.05 K/UL (ref 0–0.58)
IMM GRANULOCYTES NFR BLD: 0 % (ref 0–5)
LIPASE SERPL-CCNC: 24 U/L (ref 13–60)
LYMPHOCYTES NFR BLD: 0.75 K/UL (ref 1.5–4)
LYMPHOCYTES RELATIVE PERCENT: 6 % (ref 20–42)
Lab: NORMAL
MAGNESIUM SERPL-MCNC: 1.8 MG/DL (ref 1.6–2.6)
MCH RBC QN AUTO: 29.2 PG (ref 26–35)
MCHC RBC AUTO-ENTMCNC: 32.9 G/DL (ref 32–34.5)
MCV RBC AUTO: 88.8 FL (ref 80–99.9)
MONOCYTES NFR BLD: 0.46 K/UL (ref 0.1–0.95)
MONOCYTES NFR BLD: 4 % (ref 2–12)
NEGATIVE QC PASS/FAIL: NORMAL
NEUTROPHILS NFR BLD: 89 % (ref 43–80)
NEUTS SEG NFR BLD: 10.58 K/UL (ref 1.8–7.3)
PLATELET # BLD AUTO: 302 K/UL (ref 130–450)
PMV BLD AUTO: 11.6 FL (ref 7–12)
POSITIVE QC PASS/FAIL: NORMAL
POTASSIUM SERPL-SCNC: 4.2 MMOL/L (ref 3.5–5)
PROT SERPL-MCNC: 8.5 G/DL (ref 6.4–8.3)
RBC # BLD AUTO: 4.8 M/UL (ref 3.5–5.5)
SARS-COV-2 RDRP RESP QL NAA+PROBE: NOT DETECTED
SODIUM SERPL-SCNC: 138 MMOL/L (ref 132–146)
SPECIMEN DESCRIPTION: NORMAL
WBC OTHER # BLD: 11.9 K/UL (ref 4.5–11.5)

## 2023-08-26 PROCEDURE — 83735 ASSAY OF MAGNESIUM: CPT

## 2023-08-26 PROCEDURE — 85025 COMPLETE CBC W/AUTO DIFF WBC: CPT

## 2023-08-26 PROCEDURE — 87635 SARS-COV-2 COVID-19 AMP PRB: CPT

## 2023-08-26 PROCEDURE — 80053 COMPREHEN METABOLIC PANEL: CPT

## 2023-08-26 PROCEDURE — 2580000003 HC RX 258: Performed by: EMERGENCY MEDICINE

## 2023-08-26 PROCEDURE — 99284 EMERGENCY DEPT VISIT MOD MDM: CPT

## 2023-08-26 PROCEDURE — 96374 THER/PROPH/DIAG INJ IV PUSH: CPT

## 2023-08-26 PROCEDURE — 96375 TX/PRO/DX INJ NEW DRUG ADDON: CPT

## 2023-08-26 PROCEDURE — 83690 ASSAY OF LIPASE: CPT

## 2023-08-26 PROCEDURE — 6360000002 HC RX W HCPCS: Performed by: EMERGENCY MEDICINE

## 2023-08-26 PROCEDURE — 96361 HYDRATE IV INFUSION ADD-ON: CPT

## 2023-08-26 RX ORDER — 0.9 % SODIUM CHLORIDE 0.9 %
1000 INTRAVENOUS SOLUTION INTRAVENOUS ONCE
Status: COMPLETED | OUTPATIENT
Start: 2023-08-26 | End: 2023-08-27

## 2023-08-26 RX ORDER — ONDANSETRON 2 MG/ML
4 INJECTION INTRAMUSCULAR; INTRAVENOUS ONCE
Status: COMPLETED | OUTPATIENT
Start: 2023-08-26 | End: 2023-08-26

## 2023-08-26 RX ORDER — KETOROLAC TROMETHAMINE 30 MG/ML
30 INJECTION, SOLUTION INTRAMUSCULAR; INTRAVENOUS ONCE
Status: COMPLETED | OUTPATIENT
Start: 2023-08-26 | End: 2023-08-26

## 2023-08-26 RX ADMIN — KETOROLAC TROMETHAMINE 30 MG: 30 INJECTION, SOLUTION INTRAMUSCULAR; INTRAVENOUS at 23:10

## 2023-08-26 RX ADMIN — ONDANSETRON 4 MG: 2 INJECTION INTRAMUSCULAR; INTRAVENOUS at 23:10

## 2023-08-26 RX ADMIN — SODIUM CHLORIDE 1000 ML: 9 INJECTION, SOLUTION INTRAVENOUS at 23:09

## 2023-08-26 ASSESSMENT — ENCOUNTER SYMPTOMS
WHEEZING: 0
RHINORRHEA: 1
ABDOMINAL PAIN: 1
VOMITING: 1
NAUSEA: 1
SINUS PRESSURE: 0
SORE THROAT: 0
ABDOMINAL DISTENTION: 0
COUGH: 1
SHORTNESS OF BREATH: 0
BACK PAIN: 0
DIARRHEA: 1
CHEST TIGHTNESS: 0

## 2023-08-26 ASSESSMENT — PAIN DESCRIPTION - DESCRIPTORS
DESCRIPTORS: SHOOTING;SHARP
DESCRIPTORS: SHARP

## 2023-08-26 ASSESSMENT — PAIN - FUNCTIONAL ASSESSMENT: PAIN_FUNCTIONAL_ASSESSMENT: 0-10

## 2023-08-26 ASSESSMENT — PAIN SCALES - GENERAL
PAINLEVEL_OUTOF10: 8
PAINLEVEL_OUTOF10: 7

## 2023-08-26 ASSESSMENT — LIFESTYLE VARIABLES
HOW MANY STANDARD DRINKS CONTAINING ALCOHOL DO YOU HAVE ON A TYPICAL DAY: 1 OR 2
HOW OFTEN DO YOU HAVE A DRINK CONTAINING ALCOHOL: MONTHLY OR LESS

## 2023-08-26 ASSESSMENT — PAIN DESCRIPTION - LOCATION
LOCATION: ABDOMEN
LOCATION: ABDOMEN

## 2023-08-27 RX ORDER — PROMETHAZINE HYDROCHLORIDE 25 MG/1
25 TABLET ORAL EVERY 6 HOURS PRN
Qty: 8 TABLET | Refills: 0 | Status: SHIPPED | OUTPATIENT
Start: 2023-08-27

## 2023-08-27 NOTE — ED NOTES
Pt given d/c instructions and was able to verbalize understanding. Pt ambulatory out of department.       Ruby Anderson RN  08/27/23 6719

## 2023-08-27 NOTE — DISCHARGE INSTRUCTIONS
0945 on 1/22, please offer Return if symptoms change or worsen. Thank you for the opportunity to care for you during this emergency department visit. I hope you are doing better. We strive to always improve our care. You may receive a survey and I hope you had a positive experience here. We greatly appreciate your 5 scores.

## 2024-05-28 ENCOUNTER — HOSPITAL ENCOUNTER (EMERGENCY)
Age: 24
Discharge: LEFT BEFORE DISCHARGE | End: 2024-05-28

## 2024-05-28 VITALS
OXYGEN SATURATION: 95 % | RESPIRATION RATE: 19 BRPM | HEART RATE: 121 BPM | DIASTOLIC BLOOD PRESSURE: 84 MMHG | TEMPERATURE: 99.7 F | SYSTOLIC BLOOD PRESSURE: 129 MMHG

## 2024-05-29 ENCOUNTER — APPOINTMENT (OUTPATIENT)
Dept: GENERAL RADIOLOGY | Age: 24
End: 2024-05-29
Payer: MEDICAID

## 2024-05-29 ENCOUNTER — APPOINTMENT (OUTPATIENT)
Dept: CT IMAGING | Age: 24
End: 2024-05-29
Payer: MEDICAID

## 2024-05-29 ENCOUNTER — HOSPITAL ENCOUNTER (EMERGENCY)
Age: 24
Discharge: HOME | End: 2024-05-30
Attending: EMERGENCY MEDICINE
Payer: MEDICAID

## 2024-05-29 DIAGNOSIS — R10.9 FLANK PAIN: ICD-10-CM

## 2024-05-29 DIAGNOSIS — R52 PAIN: Primary | ICD-10-CM

## 2024-05-29 DIAGNOSIS — N12 PYELONEPHRITIS: ICD-10-CM

## 2024-05-29 LAB
ALBUMIN SERPL-MCNC: 4.1 G/DL (ref 3.5–5.2)
ALP SERPL-CCNC: 74 U/L (ref 35–104)
ALT SERPL-CCNC: 12 U/L (ref 0–32)
ANION GAP SERPL CALCULATED.3IONS-SCNC: 14 MMOL/L (ref 7–16)
AST SERPL-CCNC: 18 U/L (ref 0–31)
BACTERIA URNS QL MICRO: ABNORMAL
BASOPHILS # BLD: 0.03 K/UL (ref 0–0.2)
BASOPHILS NFR BLD: 0 % (ref 0–2)
BILIRUB SERPL-MCNC: 0.3 MG/DL (ref 0–1.2)
BILIRUB UR QL STRIP: NEGATIVE
BUN SERPL-MCNC: 10 MG/DL (ref 6–20)
C TRACH DNA SPEC QL PROBE+SIG AMP: NORMAL
CALCIUM SERPL-MCNC: 8.7 MG/DL (ref 8.6–10.2)
CHLORIDE SERPL-SCNC: 100 MMOL/L (ref 98–107)
CLARITY UR: CLEAR
CLUE CELLS VAG QL WET PREP: NORMAL
CO2 SERPL-SCNC: 20 MMOL/L (ref 22–29)
COLOR UR: YELLOW
CREAT SERPL-MCNC: 0.8 MG/DL (ref 0.5–1)
EOSINOPHIL # BLD: 0.01 K/UL (ref 0.05–0.5)
EOSINOPHILS RELATIVE PERCENT: 0 % (ref 0–6)
EPI CELLS #/AREA URNS HPF: ABNORMAL /HPF
ERYTHROCYTE [DISTWIDTH] IN BLOOD BY AUTOMATED COUNT: 13.2 % (ref 11.5–15)
GFR, ESTIMATED: >90 ML/MIN/1.73M2
GLUCOSE BLD-MCNC: 109 MG/DL (ref 74–99)
GLUCOSE SERPL-MCNC: 108 MG/DL (ref 74–99)
GLUCOSE UR STRIP-MCNC: NEGATIVE MG/DL
HCG, URINE, POC: NEGATIVE
HCT VFR BLD AUTO: 34.8 % (ref 34–48)
HGB BLD-MCNC: 11.7 G/DL (ref 11.5–15.5)
HGB UR QL STRIP.AUTO: ABNORMAL
IMM GRANULOCYTES # BLD AUTO: 0.05 K/UL (ref 0–0.58)
IMM GRANULOCYTES NFR BLD: 0 % (ref 0–5)
KETONES UR STRIP-MCNC: NEGATIVE MG/DL
LACTATE BLDV-SCNC: 1 MMOL/L (ref 0.5–1.9)
LEUKOCYTE ESTERASE UR QL STRIP: NEGATIVE
LIPASE SERPL-CCNC: 15 U/L (ref 13–60)
LYMPHOCYTES NFR BLD: 1.3 K/UL (ref 1.5–4)
LYMPHOCYTES RELATIVE PERCENT: 10 % (ref 20–42)
Lab: NORMAL
MCH RBC QN AUTO: 29.4 PG (ref 26–35)
MCHC RBC AUTO-ENTMCNC: 33.6 G/DL (ref 32–34.5)
MCV RBC AUTO: 87.4 FL (ref 80–99.9)
MONOCYTES NFR BLD: 1.45 K/UL (ref 0.1–0.95)
MONOCYTES NFR BLD: 11 % (ref 2–12)
N GONORRHOEA DNA SPEC QL PROBE+SIG AMP: NORMAL
NEGATIVE QC PASS/FAIL: NORMAL
NEUTROPHILS NFR BLD: 78 % (ref 43–80)
NEUTS SEG NFR BLD: 9.87 K/UL (ref 1.8–7.3)
NITRITE UR QL STRIP: NEGATIVE
PH UR STRIP: 8 [PH] (ref 5–9)
PLATELET # BLD AUTO: 296 K/UL (ref 130–450)
PMV BLD AUTO: 11.9 FL (ref 7–12)
POSITIVE QC PASS/FAIL: NORMAL
POTASSIUM SERPL-SCNC: 3.8 MMOL/L (ref 3.5–5)
PROT SERPL-MCNC: 7.7 G/DL (ref 6.4–8.3)
PROT UR STRIP-MCNC: NEGATIVE MG/DL
RBC # BLD AUTO: 3.98 M/UL (ref 3.5–5.5)
RBC #/AREA URNS HPF: ABNORMAL /HPF
SODIUM SERPL-SCNC: 134 MMOL/L (ref 132–146)
SOURCE WET PREP: NORMAL
SP GR UR STRIP: 1.01 (ref 1–1.03)
SPECIMEN DESCRIPTION: NORMAL
T VAGINALIS VAG QL WET PREP: NORMAL
UROBILINOGEN UR STRIP-ACNC: 0.2 EU/DL (ref 0–1)
WBC #/AREA URNS HPF: ABNORMAL /HPF
WBC OTHER # BLD: 12.7 K/UL (ref 4.5–11.5)
YEAST WET PREP: NORMAL

## 2024-05-29 PROCEDURE — 87491 CHLMYD TRACH DNA AMP PROBE: CPT

## 2024-05-29 PROCEDURE — 80053 COMPREHEN METABOLIC PANEL: CPT

## 2024-05-29 PROCEDURE — 96361 HYDRATE IV INFUSION ADD-ON: CPT

## 2024-05-29 PROCEDURE — 87086 URINE CULTURE/COLONY COUNT: CPT

## 2024-05-29 PROCEDURE — 2580000003 HC RX 258

## 2024-05-29 PROCEDURE — 81001 URINALYSIS AUTO W/SCOPE: CPT

## 2024-05-29 PROCEDURE — 82962 GLUCOSE BLOOD TEST: CPT

## 2024-05-29 PROCEDURE — 74177 CT ABD & PELVIS W/CONTRAST: CPT

## 2024-05-29 PROCEDURE — 6360000004 HC RX CONTRAST MEDICATION: Performed by: RADIOLOGY

## 2024-05-29 PROCEDURE — 6370000000 HC RX 637 (ALT 250 FOR IP): Performed by: EMERGENCY MEDICINE

## 2024-05-29 PROCEDURE — 87591 N.GONORRHOEAE DNA AMP PROB: CPT

## 2024-05-29 PROCEDURE — 85025 COMPLETE CBC W/AUTO DIFF WBC: CPT

## 2024-05-29 PROCEDURE — 96374 THER/PROPH/DIAG INJ IV PUSH: CPT

## 2024-05-29 PROCEDURE — 83605 ASSAY OF LACTIC ACID: CPT

## 2024-05-29 PROCEDURE — 96375 TX/PRO/DX INJ NEW DRUG ADDON: CPT

## 2024-05-29 PROCEDURE — 99285 EMERGENCY DEPT VISIT HI MDM: CPT

## 2024-05-29 PROCEDURE — 87088 URINE BACTERIA CULTURE: CPT

## 2024-05-29 PROCEDURE — 71046 X-RAY EXAM CHEST 2 VIEWS: CPT

## 2024-05-29 PROCEDURE — 6360000002 HC RX W HCPCS: Performed by: EMERGENCY MEDICINE

## 2024-05-29 PROCEDURE — 83690 ASSAY OF LIPASE: CPT

## 2024-05-29 PROCEDURE — 87040 BLOOD CULTURE FOR BACTERIA: CPT

## 2024-05-29 PROCEDURE — 87210 SMEAR WET MOUNT SALINE/INK: CPT

## 2024-05-29 RX ORDER — SODIUM CHLORIDE 9 MG/ML
INJECTION, SOLUTION INTRAVENOUS PRN
Status: DISCONTINUED | OUTPATIENT
Start: 2024-05-29 | End: 2024-05-30 | Stop reason: HOSPADM

## 2024-05-29 RX ORDER — 0.9 % SODIUM CHLORIDE 0.9 %
30 INTRAVENOUS SOLUTION INTRAVENOUS ONCE
Status: COMPLETED | OUTPATIENT
Start: 2024-05-29 | End: 2024-05-29

## 2024-05-29 RX ORDER — SODIUM CHLORIDE 0.9 % (FLUSH) 0.9 %
5-40 SYRINGE (ML) INJECTION PRN
Status: DISCONTINUED | OUTPATIENT
Start: 2024-05-29 | End: 2024-05-30 | Stop reason: HOSPADM

## 2024-05-29 RX ORDER — KETOROLAC TROMETHAMINE 30 MG/ML
30 INJECTION, SOLUTION INTRAMUSCULAR; INTRAVENOUS ONCE
Status: COMPLETED | OUTPATIENT
Start: 2024-05-29 | End: 2024-05-29

## 2024-05-29 RX ORDER — ACETAMINOPHEN 500 MG
1000 TABLET ORAL ONCE
Status: COMPLETED | OUTPATIENT
Start: 2024-05-29 | End: 2024-05-29

## 2024-05-29 RX ORDER — SODIUM CHLORIDE 0.9 % (FLUSH) 0.9 %
5-40 SYRINGE (ML) INJECTION EVERY 12 HOURS SCHEDULED
Status: DISCONTINUED | OUTPATIENT
Start: 2024-05-29 | End: 2024-05-30 | Stop reason: HOSPADM

## 2024-05-29 RX ORDER — SODIUM CHLORIDE 9 MG/ML
INJECTION, SOLUTION INTRAVENOUS
Status: DISCONTINUED
Start: 2024-05-29 | End: 2024-05-30 | Stop reason: HOSPADM

## 2024-05-29 RX ADMIN — SODIUM CHLORIDE, PRESERVATIVE FREE 5 ML: 5 INJECTION INTRAVENOUS at 19:23

## 2024-05-29 RX ADMIN — KETOROLAC TROMETHAMINE 30 MG: 30 INJECTION, SOLUTION INTRAMUSCULAR at 19:28

## 2024-05-29 RX ADMIN — ACETAMINOPHEN 1000 MG: 500 TABLET ORAL at 19:26

## 2024-05-29 RX ADMIN — SODIUM CHLORIDE 2586 ML: 9 INJECTION, SOLUTION INTRAVENOUS at 19:23

## 2024-05-29 RX ADMIN — IOPAMIDOL 75 ML: 755 INJECTION, SOLUTION INTRAVENOUS at 21:13

## 2024-05-29 ASSESSMENT — ENCOUNTER SYMPTOMS
VOMITING: 0
ABDOMINAL PAIN: 0
SHORTNESS OF BREATH: 0
NAUSEA: 0
EYE REDNESS: 0

## 2024-05-29 ASSESSMENT — PAIN SCALES - GENERAL
PAINLEVEL_OUTOF10: 7
PAINLEVEL_OUTOF10: 7

## 2024-05-29 NOTE — ED PROVIDER NOTES
Department of Emergency Medicine  FIRST PROVIDER TRIAGE NOTE             Independent MLP           5/29/24  6:26 PM EDT    Date of Encounter: 5/29/24   MRN: [id number]      HPI: Anila Browning is a 24 y.o. female who presents to the ED for   Complaints of a fever as high as 104, left flank pain, low back pain, nausea, vomiting, and excessive thirst.  The flank pain started 5 days ago followed by the remainder of the symptoms.     ROS: + HA, Negative for cp, sob, diarrhea, or dizziness.    PE: Gen Appearance/Constitutional: alert  CV: tachycardia  Pulm: CTA bilat  GI: tender to palpation  Back: left CVA tenderness     Initial Plan of Care: All treatment areas with department are currently occupied. Plan to order/Initiate the following while awaiting opening in ED: Sepsis protocol, chest x-ray, and IV fluid bolus.  Initiate Treatment-Testing, Proceed toTreatment Area When Bed Available for ED Attending/MLP to Continue Care    Electronically signed by ARACELIS Jules CNP   DD: 5/29/24        Sarita Laws APRN - CNP  05/29/24 0936    
agreeable to plan and all questions have been answered at this time.      PATIENT REFERRED TO:        [phone] referral line      DISCHARGE MEDICATIONS:  Discharge Medication List as of 5/30/2024  2:16 AM        START taking these medications    Details   cefdinir (OMNICEF) 300 MG capsule Take 1 capsule by mouth 2 times daily for 10 days, Disp-20 capsule, R-0Normal      ondansetron (ZOFRAN-ODT) 4 MG disintegrating tablet Take 1 tablet by mouth 3 times daily as needed for Nausea or Vomiting, Disp-10 tablet, R-0Normal                 EMERGENCY DEPARTMENT COURSE    Vitals:    Vitals:    05/29/24 2207 05/29/24 2300 05/30/24 0000 05/30/24 0115   BP: 103/73 113/67  107/61   Pulse: 83 80 71 75   Resp: 22 13 10 12   Temp:    98.3 °F (36.8 °C)   TempSrc:    Oral   SpO2: 97% 99%  99%   Weight:           ED Course as of 05/30/24 0716   Thu May 30, 2024   0125 Patient signed out to me pending results of imaging.  She was reassessed, she is feeling improved, resting comfortably in no distress, vital signs are stable.  CT imaging shows possible paranephritis, ultrasound imaging reveals no evidence of torsion or any other acute pelvic abnormalities.  Blood and urine sent for cultures.  Patient will be covered with antibiotics and given close return instructions.  She is to follow-up with PCP. [FG]      ED Course User Index  [FG] Dayne Turner DO          I am the Primary Clinician of Record.    FINAL IMPRESSION      1. Pain    2. Flank pain    3. Pyelonephritis          DISPOSITION/PLAN      Decision To Discharge 05/30/2024 02:16:01 AM    Patient's disposition: Discharge to home  Patient's condition is stable.           (Please note that portions of this note were completed with a voice recognition program.  Efforts were made to edit the dictations but occasionally words are mis-transcribed.)    Tata Vieira DO (electronically signed)        Tata Vieira DO  05/30/24 0716

## 2024-05-30 ENCOUNTER — APPOINTMENT (OUTPATIENT)
Dept: ULTRASOUND IMAGING | Age: 24
End: 2024-05-30
Payer: MEDICAID

## 2024-05-30 VITALS
BODY MASS INDEX: 33.66 KG/M2 | RESPIRATION RATE: 12 BRPM | SYSTOLIC BLOOD PRESSURE: 107 MMHG | DIASTOLIC BLOOD PRESSURE: 61 MMHG | TEMPERATURE: 98.3 F | OXYGEN SATURATION: 99 % | WEIGHT: 190 LBS | HEART RATE: 75 BPM

## 2024-05-30 PROCEDURE — 2580000003 HC RX 258: Performed by: STUDENT IN AN ORGANIZED HEALTH CARE EDUCATION/TRAINING PROGRAM

## 2024-05-30 PROCEDURE — 76830 TRANSVAGINAL US NON-OB: CPT

## 2024-05-30 PROCEDURE — 93976 VASCULAR STUDY: CPT

## 2024-05-30 PROCEDURE — 6360000002 HC RX W HCPCS: Performed by: STUDENT IN AN ORGANIZED HEALTH CARE EDUCATION/TRAINING PROGRAM

## 2024-05-30 PROCEDURE — 96375 TX/PRO/DX INJ NEW DRUG ADDON: CPT

## 2024-05-30 RX ORDER — ONDANSETRON 4 MG/1
4 TABLET, ORALLY DISINTEGRATING ORAL 3 TIMES DAILY PRN
Qty: 10 TABLET | Refills: 0 | Status: SHIPPED | OUTPATIENT
Start: 2024-05-30

## 2024-05-30 RX ORDER — CEFDINIR 300 MG/1
300 CAPSULE ORAL 2 TIMES DAILY
Qty: 20 CAPSULE | Refills: 0 | Status: SHIPPED | OUTPATIENT
Start: 2024-05-30 | End: 2024-06-09

## 2024-05-30 RX ADMIN — WATER 2000 MG: 1 INJECTION INTRAMUSCULAR; INTRAVENOUS; SUBCUTANEOUS at 02:04

## 2024-05-30 NOTE — DISCHARGE INSTRUCTIONS
If you are having persisting fevers, uncontrolled flank pain despite Tylenol or ibuprofen, inability keep anything down despite Zofran please go to emergency department for evaluation

## 2024-06-01 LAB
MICROORGANISM SPEC CULT: ABNORMAL
SERVICE CMNT-IMP: ABNORMAL
SPECIMEN DESCRIPTION: ABNORMAL

## 2024-06-02 LAB
MICROORGANISM SPEC CULT: NORMAL
MICROORGANISM SPEC CULT: NORMAL
SERVICE CMNT-IMP: NORMAL
SERVICE CMNT-IMP: NORMAL
SPECIMEN DESCRIPTION: NORMAL
SPECIMEN DESCRIPTION: NORMAL

## 2024-06-03 LAB
C TRACH DNA SPEC QL PROBE+SIG AMP: NEGATIVE
MICROORGANISM SPEC CULT: NORMAL
MICROORGANISM SPEC CULT: NORMAL
N GONORRHOEA DNA SPEC QL PROBE+SIG AMP: NEGATIVE
SERVICE CMNT-IMP: NORMAL
SERVICE CMNT-IMP: NORMAL
SPECIMEN DESCRIPTION: NORMAL

## 2024-11-23 ENCOUNTER — HOSPITAL ENCOUNTER (EMERGENCY)
Age: 24
Discharge: HOME | End: 2024-11-23
Attending: FAMILY MEDICINE
Payer: MEDICAID

## 2024-11-23 VITALS
OXYGEN SATURATION: 99 % | DIASTOLIC BLOOD PRESSURE: 74 MMHG | BODY MASS INDEX: 33.29 KG/M2 | TEMPERATURE: 99.1 F | WEIGHT: 195 LBS | HEIGHT: 64 IN | RESPIRATION RATE: 16 BRPM | SYSTOLIC BLOOD PRESSURE: 120 MMHG | HEART RATE: 80 BPM

## 2024-11-23 DIAGNOSIS — J06.9 ACUTE UPPER RESPIRATORY INFECTION: Primary | ICD-10-CM

## 2024-11-23 LAB
SPECIMEN SOURCE: NORMAL
STREP A, MOLECULAR: NEGATIVE

## 2024-11-23 PROCEDURE — 87651 STREP A DNA AMP PROBE: CPT

## 2024-11-23 PROCEDURE — 99283 EMERGENCY DEPT VISIT LOW MDM: CPT

## 2024-11-23 RX ORDER — BROMPHENIRAMINE MALEATE, PSEUDOEPHEDRINE HYDROCHLORIDE, AND DEXTROMETHORPHAN HYDROBROMIDE 2; 30; 10 MG/5ML; MG/5ML; MG/5ML
5 SYRUP ORAL 4 TIMES DAILY PRN
Qty: 118 ML | Refills: 0 | Status: SHIPPED | OUTPATIENT
Start: 2024-11-23

## 2024-11-23 RX ORDER — PREDNISONE 20 MG/1
40 TABLET ORAL DAILY
Qty: 6 TABLET | Refills: 0 | Status: SHIPPED | OUTPATIENT
Start: 2024-11-23 | End: 2024-11-26

## 2024-11-23 RX ORDER — ECHINACEA PURPUREA EXTRACT 125 MG
1 TABLET ORAL PRN
Qty: 88 ML | Refills: 0 | Status: SHIPPED | OUTPATIENT
Start: 2024-11-23

## 2024-11-23 ASSESSMENT — PAIN DESCRIPTION - DESCRIPTORS: DESCRIPTORS: SORE

## 2024-11-23 ASSESSMENT — PAIN DESCRIPTION - LOCATION: LOCATION: THROAT

## 2024-11-23 ASSESSMENT — PAIN - FUNCTIONAL ASSESSMENT: PAIN_FUNCTIONAL_ASSESSMENT: 0-10

## 2024-11-23 ASSESSMENT — PAIN DESCRIPTION - PAIN TYPE: TYPE: ACUTE PAIN

## 2024-11-23 ASSESSMENT — PAIN SCALES - GENERAL: PAINLEVEL_OUTOF10: 8

## 2024-11-23 ASSESSMENT — PAIN DESCRIPTION - FREQUENCY: FREQUENCY: CONTINUOUS

## 2024-11-23 NOTE — ED PROVIDER NOTES
HPI:  24,   Time: 11:06 AM FARIDEH Browning is a 24 y.o. female presenting to the ED for approximately 4 days of upper respiratory symptoms include nasal congestion, thick nasal discharge that is greenish in the morning but clear throughout the day, nonproductive cough and chest tightness and discomfort.  She denies history of reactive airway disease or using metered-dose inhalers.  She denies nausea vomiting or diarrhea.  She denies fever chills or bodyaches.  She complains mostly of a sore throat and throat irritation when swallowing.  She denies sick contacts.  She does have a 5-year-old child at home, goes to  but he is not ill.        ROS:   Pertinent positives and negatives are stated within HPI, all other systems reviewed and are negative.  --------------------------------------------- PAST HISTORY ---------------------------------------------  Past Medical History:  has no past medical history on file.    Past Surgical History:  has a past surgical history that includes pr office/outpt visit,procedure only (N/A, 2018); Cervical Cerclage;  section (N/A, 2019); and  section (N/A, 2021).    Social History:  reports that she has been smoking. She has a 1.5 pack-year smoking history. She has never used smokeless tobacco. She reports that she does not drink alcohol and does not use drugs.    Family History: family history includes Asthma in her mother; Colon Cancer in her mother; Heart Attack in her maternal grandfather.     The patient’s home medications have been reviewed.    Allergies: Seasonal    -------------------------------------------------- RESULTS -------------------------------------------------  All laboratory and radiology results have been personally reviewed by myself   LABS:  Results for orders placed or performed during the hospital encounter of 24   Rapid Strep Screen    Specimen: Throat   Result Value Ref Range    Source .THROAT

## 2024-12-17 NOTE — PROGRESS NOTES
See message that was taken by access center.   fhts tachy, temp rechecked.   Patient emesis 400 cc.  shivering

## 2024-12-30 ENCOUNTER — HOSPITAL ENCOUNTER (EMERGENCY)
Age: 24
Discharge: HOME | End: 2024-12-30
Attending: EMERGENCY MEDICINE
Payer: MEDICAID

## 2024-12-30 VITALS
OXYGEN SATURATION: 98 % | RESPIRATION RATE: 18 BRPM | HEART RATE: 120 BPM | DIASTOLIC BLOOD PRESSURE: 63 MMHG | TEMPERATURE: 102.2 F | SYSTOLIC BLOOD PRESSURE: 109 MMHG

## 2024-12-30 DIAGNOSIS — J06.9 ACUTE UPPER RESPIRATORY INFECTION: Primary | ICD-10-CM

## 2024-12-30 LAB
FLUAV RNA RESP QL NAA+PROBE: NOT DETECTED
FLUBV RNA RESP QL NAA+PROBE: NOT DETECTED
SARS-COV-2 RNA RESP QL NAA+PROBE: NOT DETECTED
SOURCE: NORMAL
SPECIMEN DESCRIPTION: NORMAL

## 2024-12-30 PROCEDURE — 87636 SARSCOV2 & INF A&B AMP PRB: CPT

## 2024-12-30 PROCEDURE — 99283 EMERGENCY DEPT VISIT LOW MDM: CPT

## 2024-12-30 RX ORDER — CEFDINIR 300 MG/1
300 CAPSULE ORAL 2 TIMES DAILY
Qty: 20 CAPSULE | Refills: 0 | Status: SHIPPED | OUTPATIENT
Start: 2024-12-30 | End: 2025-01-09

## 2024-12-30 RX ORDER — BROMPHENIRAMINE MALEATE, PSEUDOEPHEDRINE HYDROCHLORIDE, AND DEXTROMETHORPHAN HYDROBROMIDE 2; 30; 10 MG/5ML; MG/5ML; MG/5ML
5 SYRUP ORAL 4 TIMES DAILY PRN
Qty: 120 ML | Refills: 0 | Status: SHIPPED | OUTPATIENT
Start: 2024-12-30

## 2024-12-30 ASSESSMENT — PAIN DESCRIPTION - DESCRIPTORS: DESCRIPTORS: DISCOMFORT

## 2024-12-30 ASSESSMENT — PAIN SCALES - GENERAL
PAINLEVEL_OUTOF10: 8
PAINLEVEL_OUTOF10: 8

## 2024-12-30 ASSESSMENT — ENCOUNTER SYMPTOMS
EYE DISCHARGE: 0
RHINORRHEA: 1
DIARRHEA: 0
ABDOMINAL DISTENTION: 0
SHORTNESS OF BREATH: 0
SINUS PRESSURE: 0
WHEEZING: 0
SORE THROAT: 0
COUGH: 0
NAUSEA: 0
BACK PAIN: 0
EYE PAIN: 0
EYE REDNESS: 0
VOMITING: 0

## 2024-12-30 ASSESSMENT — PAIN - FUNCTIONAL ASSESSMENT
PAIN_FUNCTIONAL_ASSESSMENT: 0-10
PAIN_FUNCTIONAL_ASSESSMENT: 0-10
PAIN_FUNCTIONAL_ASSESSMENT: PREVENTS OR INTERFERES SOME ACTIVE ACTIVITIES AND ADLS

## 2024-12-30 ASSESSMENT — PAIN DESCRIPTION - FREQUENCY: FREQUENCY: CONTINUOUS

## 2024-12-30 ASSESSMENT — PAIN DESCRIPTION - PAIN TYPE: TYPE: ACUTE PAIN

## 2024-12-31 NOTE — ED PROVIDER NOTES
is no guarding or rebound.   Musculoskeletal:      Cervical back: Normal range of motion and neck supple.   Skin:     General: Skin is warm and dry.   Neurological:      Mental Status: She is alert and oriented to person, place, and time.      Cranial Nerves: No cranial nerve deficit.      Coordination: Coordination normal.          Procedures     MDM          --------------------------------------------- PAST HISTORY ---------------------------------------------  Past Medical History:  has no past medical history on file.    Past Surgical History:  has a past surgical history that includes pr office/outpt visit,procedure only (N/A, 2018); Cervical Cerclage;  section (N/A, 2019); and  section (N/A, 2021).    Social History:  reports that she has been smoking. She has a 1.5 pack-year smoking history. She has never used smokeless tobacco. She reports that she does not drink alcohol and does not use drugs.    Family History: family history includes Asthma in her mother; Colon Cancer in her mother; Heart Attack in her maternal grandfather.     The patient’s home medications have been reviewed.    Allergies: Seasonal    -------------------------------------------------- RESULTS -------------------------------------------------  Labs:  Results for orders placed or performed during the hospital encounter of 24   COVID-19 & Influenza Combo    Specimen: Nasopharyngeal Swab   Result Value Ref Range    Specimen Description .NASOPHARYNGEAL SWAB     Source swab     SARS-CoV-2 RNA, RT PCR Not Detected Not Detected    Influenza A Not Detected Not Detected    Influenza B Not Detected Not Detected       Radiology:  No orders to display       ------------------------- NURSING NOTES AND VITALS REVIEWED ---------------------------  Date / Time Roomed:  2024  7:06 PM  ED Bed Assignment:      The nursing notes within the ED encounter and vital signs as below have been reviewed.   /63

## 2025-03-28 ENCOUNTER — HOSPITAL ENCOUNTER (EMERGENCY)
Age: 25
Discharge: HOME | End: 2025-03-28
Attending: EMERGENCY MEDICINE
Payer: MEDICAID

## 2025-03-28 VITALS
SYSTOLIC BLOOD PRESSURE: 117 MMHG | TEMPERATURE: 99 F | RESPIRATION RATE: 16 BRPM | BODY MASS INDEX: 31.89 KG/M2 | DIASTOLIC BLOOD PRESSURE: 67 MMHG | HEIGHT: 63 IN | HEART RATE: 70 BPM | WEIGHT: 180 LBS | OXYGEN SATURATION: 99 %

## 2025-03-28 DIAGNOSIS — K04.7 DENTAL INFECTION: Primary | ICD-10-CM

## 2025-03-28 PROCEDURE — 99283 EMERGENCY DEPT VISIT LOW MDM: CPT

## 2025-03-28 RX ORDER — CLINDAMYCIN HYDROCHLORIDE 150 MG/1
150 CAPSULE ORAL 4 TIMES DAILY
Qty: 40 CAPSULE | Refills: 0 | Status: SHIPPED | OUTPATIENT
Start: 2025-03-28 | End: 2025-04-07

## 2025-03-28 RX ORDER — IBUPROFEN 600 MG/1
600 TABLET, FILM COATED ORAL EVERY 8 HOURS PRN
Qty: 30 TABLET | Refills: 0 | Status: SHIPPED | OUTPATIENT
Start: 2025-03-28 | End: 2025-04-07

## 2025-03-28 ASSESSMENT — PAIN DESCRIPTION - LOCATION: LOCATION: JAW

## 2025-03-28 ASSESSMENT — ENCOUNTER SYMPTOMS
NAUSEA: 0
VOMITING: 0
SHORTNESS OF BREATH: 0
BACK PAIN: 0
DIARRHEA: 0
SORE THROAT: 0
COUGH: 0
EYE PAIN: 0
SINUS PRESSURE: 0
EYE REDNESS: 0
ABDOMINAL DISTENTION: 0
EYE DISCHARGE: 0
WHEEZING: 0

## 2025-03-28 ASSESSMENT — PAIN DESCRIPTION - ORIENTATION: ORIENTATION: LEFT;UPPER

## 2025-03-28 ASSESSMENT — PAIN SCALES - GENERAL: PAINLEVEL_OUTOF10: 9

## 2025-03-28 ASSESSMENT — PAIN DESCRIPTION - PAIN TYPE: TYPE: ACUTE PAIN

## 2025-03-28 ASSESSMENT — PAIN DESCRIPTION - FREQUENCY: FREQUENCY: CONTINUOUS

## 2025-03-28 ASSESSMENT — PAIN - FUNCTIONAL ASSESSMENT: PAIN_FUNCTIONAL_ASSESSMENT: 0-10

## 2025-03-28 ASSESSMENT — PAIN DESCRIPTION - DESCRIPTORS: DESCRIPTORS: THROBBING

## 2025-03-28 NOTE — ED PROVIDER NOTES
Their questions are answered at this time and they are agreeable with the plan. I discussed at length with them reasons for immediate return here for re evaluation. They will followup with primary care by calling their office tomorrow.    Medications - No data to display      --------------------------------- ADDITIONAL PROVIDER NOTES ---------------------------------  At this time the patient is without objective evidence of an acute process requiring hospitalization or inpatient management.  They have remained hemodynamically stable throughout their entire ED visit and are stable for discharge with outpatient follow-up.     The plan has been discussed in detail and they are aware of the specific conditions for emergent return, as well as the importance of follow-up.      New Prescriptions    CLINDAMYCIN (CLEOCIN) 150 MG CAPSULE    Take 1 capsule by mouth 4 times daily for 10 days    IBUPROFEN (IBU) 600 MG TABLET    Take 1 tablet by mouth every 8 hours as needed for Pain       Diagnosis:  1. Dental infection        Disposition:  Patient's disposition: Discharge to home  Patient's condition is stable.                    Raul Patel MD  03/28/25 1429

## 2025-03-29 ENCOUNTER — HOSPITAL ENCOUNTER (EMERGENCY)
Age: 25
Discharge: HOME | End: 2025-03-29
Payer: MEDICAID

## 2025-03-29 VITALS
RESPIRATION RATE: 16 BRPM | HEART RATE: 76 BPM | SYSTOLIC BLOOD PRESSURE: 128 MMHG | DIASTOLIC BLOOD PRESSURE: 77 MMHG | OXYGEN SATURATION: 98 % | TEMPERATURE: 97.9 F

## 2025-03-29 DIAGNOSIS — K08.89 PAIN, DENTAL: Primary | ICD-10-CM

## 2025-03-29 PROCEDURE — 6370000000 HC RX 637 (ALT 250 FOR IP): Performed by: PHYSICIAN ASSISTANT

## 2025-03-29 PROCEDURE — 99283 EMERGENCY DEPT VISIT LOW MDM: CPT

## 2025-03-29 RX ORDER — HYDROCODONE BITARTRATE AND ACETAMINOPHEN 5; 325 MG/1; MG/1
1 TABLET ORAL ONCE
Status: COMPLETED | OUTPATIENT
Start: 2025-03-29 | End: 2025-03-29

## 2025-03-29 RX ORDER — HYDROCODONE BITARTRATE AND ACETAMINOPHEN 5; 325 MG/1; MG/1
1 TABLET ORAL EVERY 4 HOURS PRN
Qty: 18 TABLET | Refills: 0 | Status: SHIPPED | OUTPATIENT
Start: 2025-03-29 | End: 2025-04-01

## 2025-03-29 RX ADMIN — HYDROCODONE BITARTRATE AND ACETAMINOPHEN 1 TABLET: 5; 325 TABLET ORAL at 10:25

## 2025-03-29 ASSESSMENT — PAIN DESCRIPTION - LOCATION: LOCATION: TEETH

## 2025-03-29 ASSESSMENT — PAIN SCALES - GENERAL: PAINLEVEL_OUTOF10: 10

## 2025-03-29 NOTE — ED PROVIDER NOTES
Independent ZEV Visit.   HPI:  3/29/25, Time: 10:03 AM EDT         Anila Browning is a 25 y.o. female presenting to the ED for dental pain , beginning 2 months worse today .  The complaint has been persistent, moderate in severity, and worsened by chewing .     Patient scheduled for root canal  of left upper tooth on Thursday.  Seen in urgent care yesterday placed on Motrin and patient's antibiotic was switched to clindamycin.  She is having persistent pain difficulty chewing no difficulty swallowing.      Review of Systems:   A complete review of systems was performed and pertinent positives and negatives are stated within HPI, all other systems reviewed and are negative.          --------------------------------------------- PAST HISTORY ---------------------------------------------  Past Medical History:  has no past medical history on file.    Past Surgical History:  has a past surgical history that includes pr office/outpt visit,procedure only (N/A, 2018); Cervical Cerclage;  section (N/A, 2019); and  section (N/A, 2021).    Social History:  reports that she has been smoking. She has a 1.5 pack-year smoking history. She has never used smokeless tobacco. She reports that she does not drink alcohol and does not use drugs.    Family History: family history includes Asthma in her mother; Colon Cancer in her mother; Heart Attack in her maternal grandfather.     The patient’s home medications have been reviewed.    Allergies: Seasonal    -------------------------------------------------- RESULTS -------------------------------------------------  All laboratory and radiology results have been personally reviewed by myself   LABS:  No results found for this visit on 25.    RADIOLOGY:  Interpreted by Radiologist.  No orders to display       ------------------------- NURSING NOTES AND VITALS REVIEWED ---------------------------   The nursing notes within the ED encounter and vital

## 2025-03-29 NOTE — DISCHARGE INSTR - COC
Continuity of Care Form    Patient Name: Anila Browning   :  2000  MRN:  [id number]    Admit date:  3/29/2025  Discharge date:  ***    Code Status Order: Prior   Advance Directives:     Admitting Physician:  No admitting provider for patient encounter.  PCP: No primary care provider on file.    Discharging Nurse: ***  Discharging Hospital Unit/Room#: LOBDIJ50/INT-01  Discharging Unit Phone Number: ***    Emergency Contact:   Extended Emergency Contact Information  Primary Emergency Contact: Krista Garcia   [facility]  Home Phone: [phone]  Relation: Parent   needed? No  Secondary Emergency Contact: beatrice babcock  Mobile Phone: [phone]  Relation: Boyfriend  Preferred language: English   needed? No    Past Surgical History:  Past Surgical History:   Procedure Laterality Date    CERVICAL CERCLAGE       SECTION N/A 2019     SECTION performed by Omar Martinez MD at [facility] L&D     SECTION N/A 2021     SECTION performed by Lindsey Nicholas MD at [facility] L&D OR    [place] OFFICE/OUTPT VISIT,PROCEDURE ONLY N/A 2018    CERVIX CERCLAGE performed by Lindsey Nicholas MD at [facility] OR       Immunization History:   Immunization History   Administered Date(s) Administered    DTaP vaccine 2001, 2001, 2002, 2006    HPV (Human Papilloma Virus)Vaccine 2013, 2013    HPV Quadrivalent (Gardasil) 2013, 2013    Hep B, ENGERIX-B, RECOMBIVAX-HB, (age Birth - 19y), IM, 0.5mL 2001, 2001, 2002    Influenza, FLUARIX, FLULAVAL, FLUZONE (age 6 mo+) and AFLURIA, (age 3 y+), Quadv PF, 0.5mL 10/10/2013    Influenza, Quadv, 6 mo and older, IM, PF (Flulaval, Fluarix) 2019    MMR, PRIORIX, M-M-R II, (age 12m+), SC, 0.5mL 2002, 2019    Meningococcal ACWY, MENACTRA (MenACWY-D), (age 9m-55y), IM, 0.5mL 2013    Poliovirus, IPOL, (age 6w+), SC/IM, 0.5mL 2001, 2001,

## 2025-04-02 ENCOUNTER — HOSPITAL ENCOUNTER (EMERGENCY)
Age: 25
Discharge: ANOTHER HOSPITAL | End: 2025-04-03
Attending: EMERGENCY MEDICINE
Payer: MEDICAID

## 2025-04-02 ENCOUNTER — APPOINTMENT (OUTPATIENT)
Dept: CT IMAGING | Age: 25
End: 2025-04-02
Payer: MEDICAID

## 2025-04-02 DIAGNOSIS — K04.7: Primary | ICD-10-CM

## 2025-04-02 DIAGNOSIS — K02.9 PAIN DUE TO DENTAL CARIES: ICD-10-CM

## 2025-04-02 LAB
ANION GAP SERPL CALCULATED.3IONS-SCNC: 11 MMOL/L (ref 7–16)
BASOPHILS # BLD: 0.06 K/UL (ref 0–0.2)
BASOPHILS NFR BLD: 1 % (ref 0–2)
BUN SERPL-MCNC: 9 MG/DL (ref 6–20)
CALCIUM SERPL-MCNC: 9.3 MG/DL (ref 8.6–10.2)
CHLORIDE SERPL-SCNC: 106 MMOL/L (ref 98–107)
CO2 SERPL-SCNC: 21 MMOL/L (ref 22–29)
CREAT SERPL-MCNC: 0.7 MG/DL (ref 0.5–1)
EOSINOPHIL # BLD: 0.37 K/UL (ref 0.05–0.5)
EOSINOPHILS RELATIVE PERCENT: 4 % (ref 0–6)
ERYTHROCYTE [DISTWIDTH] IN BLOOD BY AUTOMATED COUNT: 15.3 % (ref 11.5–15)
GFR, ESTIMATED: >90 ML/MIN/1.73M2
GLUCOSE SERPL-MCNC: 74 MG/DL (ref 74–99)
HCG, URINE, POC: NEGATIVE
HCT VFR BLD AUTO: 35.3 % (ref 34–48)
HGB BLD-MCNC: 11.6 G/DL (ref 11.5–15.5)
IMM GRANULOCYTES # BLD AUTO: <0.03 K/UL (ref 0–0.58)
IMM GRANULOCYTES NFR BLD: 0 % (ref 0–5)
LACTATE BLDV-SCNC: 0.6 MMOL/L (ref 0.5–1.9)
LACTATE BLDV-SCNC: 1 MMOL/L (ref 0.5–1.9)
LYMPHOCYTES NFR BLD: 2.59 K/UL (ref 1.5–4)
LYMPHOCYTES RELATIVE PERCENT: 26 % (ref 20–42)
Lab: NORMAL
MCH RBC QN AUTO: 28.2 PG (ref 26–35)
MCHC RBC AUTO-ENTMCNC: 32.9 G/DL (ref 32–34.5)
MCV RBC AUTO: 85.9 FL (ref 80–99.9)
MONOCYTES NFR BLD: 0.76 K/UL (ref 0.1–0.95)
MONOCYTES NFR BLD: 8 % (ref 2–12)
NEGATIVE QC PASS/FAIL: NORMAL
NEUTROPHILS NFR BLD: 61 % (ref 43–80)
NEUTS SEG NFR BLD: 6.01 K/UL (ref 1.8–7.3)
PLATELET # BLD AUTO: 364 K/UL (ref 130–450)
PMV BLD AUTO: 11.9 FL (ref 7–12)
POSITIVE QC PASS/FAIL: NORMAL
POTASSIUM SERPL-SCNC: 4.3 MMOL/L (ref 3.5–5)
RBC # BLD AUTO: 4.11 M/UL (ref 3.5–5.5)
SODIUM SERPL-SCNC: 138 MMOL/L (ref 132–146)
WBC OTHER # BLD: 9.8 K/UL (ref 4.5–11.5)

## 2025-04-02 PROCEDURE — 96375 TX/PRO/DX INJ NEW DRUG ADDON: CPT

## 2025-04-02 PROCEDURE — 80048 BASIC METABOLIC PNL TOTAL CA: CPT

## 2025-04-02 PROCEDURE — 70487 CT MAXILLOFACIAL W/DYE: CPT

## 2025-04-02 PROCEDURE — 87040 BLOOD CULTURE FOR BACTERIA: CPT

## 2025-04-02 PROCEDURE — 6370000000 HC RX 637 (ALT 250 FOR IP)

## 2025-04-02 PROCEDURE — 83605 ASSAY OF LACTIC ACID: CPT

## 2025-04-02 PROCEDURE — 6360000002 HC RX W HCPCS

## 2025-04-02 PROCEDURE — 85025 COMPLETE CBC W/AUTO DIFF WBC: CPT

## 2025-04-02 PROCEDURE — 96365 THER/PROPH/DIAG IV INF INIT: CPT

## 2025-04-02 PROCEDURE — 99285 EMERGENCY DEPT VISIT HI MDM: CPT

## 2025-04-02 PROCEDURE — 6360000004 HC RX CONTRAST MEDICATION: Performed by: RADIOLOGY

## 2025-04-02 PROCEDURE — 2580000003 HC RX 258

## 2025-04-02 RX ORDER — IOPAMIDOL 755 MG/ML
75 INJECTION, SOLUTION INTRAVASCULAR
Status: COMPLETED | OUTPATIENT
Start: 2025-04-02 | End: 2025-04-02

## 2025-04-02 RX ORDER — OXYCODONE AND ACETAMINOPHEN 5; 325 MG/1; MG/1
1 TABLET ORAL ONCE
Refills: 0 | Status: COMPLETED | OUTPATIENT
Start: 2025-04-02 | End: 2025-04-02

## 2025-04-02 RX ORDER — KETOROLAC TROMETHAMINE 30 MG/ML
30 INJECTION, SOLUTION INTRAMUSCULAR; INTRAVENOUS ONCE
Status: COMPLETED | OUTPATIENT
Start: 2025-04-02 | End: 2025-04-02

## 2025-04-02 RX ADMIN — AMPICILLIN SODIUM AND SULBACTAM SODIUM 3000 MG: 2; 1 INJECTION, POWDER, FOR SOLUTION INTRAMUSCULAR; INTRAVENOUS at 18:46

## 2025-04-02 RX ADMIN — OXYCODONE AND ACETAMINOPHEN 1 TABLET: 5; 325 TABLET ORAL at 18:18

## 2025-04-02 RX ADMIN — OXYCODONE AND ACETAMINOPHEN 1 TABLET: 5; 325 TABLET ORAL at 22:04

## 2025-04-02 RX ADMIN — KETOROLAC TROMETHAMINE 30 MG: 30 INJECTION, SOLUTION INTRAMUSCULAR; INTRAVENOUS at 18:45

## 2025-04-02 RX ADMIN — IOPAMIDOL 75 ML: 755 INJECTION, SOLUTION INTRAVENOUS at 20:07

## 2025-04-02 ASSESSMENT — LIFESTYLE VARIABLES
HOW MANY STANDARD DRINKS CONTAINING ALCOHOL DO YOU HAVE ON A TYPICAL DAY: PATIENT DOES NOT DRINK
HOW OFTEN DO YOU HAVE A DRINK CONTAINING ALCOHOL: NEVER

## 2025-04-02 ASSESSMENT — PAIN DESCRIPTION - ORIENTATION
ORIENTATION: LEFT;UPPER
ORIENTATION: LEFT

## 2025-04-02 ASSESSMENT — PAIN SCALES - GENERAL
PAINLEVEL_OUTOF10: 10
PAINLEVEL_OUTOF10: 8
PAINLEVEL_OUTOF10: 9

## 2025-04-02 ASSESSMENT — PAIN DESCRIPTION - DESCRIPTORS
DESCRIPTORS: SHARP
DESCRIPTORS: PRESSURE;STABBING
DESCRIPTORS: PRESSURE

## 2025-04-02 ASSESSMENT — PAIN DESCRIPTION - LOCATION
LOCATION: JAW
LOCATION: TEETH;MOUTH

## 2025-04-02 NOTE — ED PROVIDER NOTES
of Pain due to dental caries was also pertinent to this visit.  Discharge Medication List as of 4/3/2025  3:23 AM        DO Dariel Silva Megan A, DO  04/03/25 170

## 2025-04-03 ENCOUNTER — HOSPITAL ENCOUNTER (EMERGENCY)
Age: 25
Discharge: HOME | End: 2025-04-03
Attending: EMERGENCY MEDICINE
Payer: MEDICAID

## 2025-04-03 VITALS
WEIGHT: 190 LBS | HEIGHT: 64 IN | OXYGEN SATURATION: 100 % | BODY MASS INDEX: 32.44 KG/M2 | RESPIRATION RATE: 16 BRPM | DIASTOLIC BLOOD PRESSURE: 62 MMHG | TEMPERATURE: 98.5 F | SYSTOLIC BLOOD PRESSURE: 130 MMHG | HEART RATE: 79 BPM

## 2025-04-03 VITALS
DIASTOLIC BLOOD PRESSURE: 66 MMHG | RESPIRATION RATE: 18 BRPM | SYSTOLIC BLOOD PRESSURE: 106 MMHG | HEART RATE: 71 BPM | OXYGEN SATURATION: 98 % | TEMPERATURE: 98.6 F

## 2025-04-03 DIAGNOSIS — K04.7 DENTAL ABSCESS: Primary | ICD-10-CM

## 2025-04-03 PROCEDURE — 6370000000 HC RX 637 (ALT 250 FOR IP)

## 2025-04-03 PROCEDURE — 41800 DRAINAGE OF GUM LESION: CPT

## 2025-04-03 PROCEDURE — 6360000002 HC RX W HCPCS

## 2025-04-03 PROCEDURE — 99284 EMERGENCY DEPT VISIT MOD MDM: CPT

## 2025-04-03 PROCEDURE — 96374 THER/PROPH/DIAG INJ IV PUSH: CPT

## 2025-04-03 RX ORDER — MORPHINE SULFATE 4 MG/ML
4 INJECTION, SOLUTION INTRAMUSCULAR; INTRAVENOUS ONCE
Refills: 0 | Status: COMPLETED | OUTPATIENT
Start: 2025-04-03 | End: 2025-04-03

## 2025-04-03 RX ADMIN — MORPHINE SULFATE 4 MG: 4 INJECTION, SOLUTION INTRAMUSCULAR; INTRAVENOUS at 04:33

## 2025-04-03 RX ADMIN — AMOXICILLIN AND CLAVULANATE POTASSIUM 1 TABLET: 875; 125 TABLET, FILM COATED ORAL at 06:02

## 2025-04-03 ASSESSMENT — PAIN DESCRIPTION - DESCRIPTORS
DESCRIPTORS: THROBBING;SORE;DISCOMFORT
DESCRIPTORS: ACHING;CRUSHING;DISCOMFORT

## 2025-04-03 ASSESSMENT — PAIN - FUNCTIONAL ASSESSMENT: PAIN_FUNCTIONAL_ASSESSMENT: 0-10

## 2025-04-03 ASSESSMENT — PAIN SCALES - GENERAL
PAINLEVEL_OUTOF10: 9
PAINLEVEL_OUTOF10: 9

## 2025-04-03 ASSESSMENT — PAIN DESCRIPTION - LOCATION
LOCATION: FACE
LOCATION: FACE

## 2025-04-03 ASSESSMENT — PAIN DESCRIPTION - ORIENTATION
ORIENTATION: LEFT
ORIENTATION: LEFT

## 2025-04-03 ASSESSMENT — PAIN DESCRIPTION - PAIN TYPE: TYPE: ACUTE PAIN

## 2025-04-03 NOTE — DISCHARGE INSTRUCTIONS
Follow-up with your dentist.  Take the antibiotics as are prescribed.  Take Tylenol and ibuprofen as needed for further symptom relief.

## 2025-04-03 NOTE — ED PROVIDER NOTES
Team with outside hospital discussed case with dental team.  Patient was either okay for outpatient follow-up or depending on her symptoms could be transferred to [facility] for further evaluation.    Patient was transferred to our facility for dental evaluation.    Patient administered morphine initially in the emergency room.  Labs reviewed from outside hospital Shows WBC count of 9.8, hemoglobin of 11.6.  Normal platelets.  Normal electrolytes.  Bicarb is 21.  Normal creatinine and kidney function.    Dental resident came down and evaluated the patient performed a bedside I&D.  From their perspective, patient is okay for outpatient follow-up.  She was given Augmentin per the recommendations.  Patient was discharged in stable condition.    CONSULTS: (Who and What was discussed)  IP CONSULT TO DENTIST        I am the Primary Clinician of Record.    FINAL IMPRESSION      1. Dental abscess          DISPOSITION/PLAN     DISPOSITION Decision To Discharge 04/03/2025 05:49:51 AM      PATIENT REFERRED TO:  [facility] [place] [address]  [phone]    [facility] - Follow up with your dentist or the dential clinic on monday      DISCHARGE MEDICATIONS:  Discharge Medication List as of 4/3/2025  5:59 AM        START taking these medications    Details   amoxicillin-clavulanate (AUGMENTIN) 875-125 MG per tablet Take 1 tablet by mouth 2 times daily for 10 days, Disp-20 tablet, R-0Normal             DISCONTINUED MEDICATIONS:  Discharge Medication List as of 4/3/2025  5:59 AM                 Patient was seen and evaluated by myself and my attending No att. providers found. Assessment and Plan discussed with attending provider, please see attestation for final plan of care.     (Please note that portions of this note were completed with a voice recognition program.  Efforts were made to edit the dictations but occasionally words are   Patient was eval by dental here in the emergency department.  Patient had incision and drainage done here by dental.  Patient can be discharged home with outpatient follow-up.  Patient will be prescribed Augmentin for home.  Patient to follow-up with dental clinic and/or her primary dentist.  Patient was comfortable with the plan.       Sidney Mtz MD  04/03/25 6051

## 2025-04-03 NOTE — CONSULTS
S: patient presents to the ED with upper left pain and swelling. She said she has been on ABX for around 1 week. She said she started on a high dose of Amoxicillin but it gave her GI side effects. She went to the urgent care this weekend and they gave her Clindamycin but said the swelling and pain got worse yesterday and therefore went to the ED for further evaluation. Patient says she has an appt with [facility] this morning to do a RCT on #12     O: Age - 25  Race - AA  Gender - F  ASA - II PS - 10/10 Allergies: NKDA, seasonal     A: EOE completed moderate extraoral swelling along the left cheek. Swelling does not extend to the left eye. IOE completed moderate left buccal and vestibular swelling on the UL quad with palatal swelling adjacent to #12     P: discussed case with Dr. Oliveros where he recommended completed incision and drainage around #12 and following up with [facility] this morning or in the dental dental clinic for further evaluation and extraction. Risk and benefits of treatment explained including risks of no treatment today. Patient opted for incision and drainage around #12. Informed consent obtained.      Applied 20% topical benzocaine. Administered 2 carpules of 4% septocaine with 1:100k epi. Used #15 Blade to make incision at the base of the vestibule around #12. Drainage of site completed with periosteal elevator and hemostats. Irrigated site multiple times with sterile saline. Penrose drain placed in incision site and sutured with 3.0 chromic gut. Gauze placed. Post-op instructions given verbally. Told patient to go to her dental appt this morning at [facility] to start the root canal. If they say they can't do the RCT today due to the swelling, I told her to come to the dental clinic this afternoon for extraction of #12. Patient understood.     Keith Velazquez DDS/Tanika Oliveros, JEAN-PIERRE

## 2025-04-11 NOTE — PROGRESS NOTES
[facility]    [address] [place] [address]  [place], [address]  Phone [phone]   [phone]      Admit Date: 2021  6:38 PM  Pt Name: Desi Samson  MRN: [id number]  : 2000  Reason for Consult:    Chief Complaint   Patient presents with    Pregnancy Problem     35 weeks 3 days  pelvic pressure     Requesting Physician:  Cristobal Peterson MD  PCP: Jeri Swan MD  History Obtained From:  patient, chart   ID consulted for 35 weeks gestation of pregnancy [Z3A.35]  Pyelonephritis [N12]  on hospital day 0  CHIEF COMPLAINT       Chief Complaint   Patient presents with    Pregnancy Problem     35 weeks 3 days  pelvic pressure     HISTORYOF PRESENT ILLNESS   Desi Samson is a 22 y.o. female who presents with   has no past medical history on file.    HPI  Pt came in due uti/backpain  She was admitted on - for uti E coli and left AMA she received unasyn then  Pt comes in uti sx and pressure pt had fevers [id number]  She denies /n/v/d  cr1 wbc15 hgb9.8 tpp389 ua blood nitrite le >wbc bacteria le   +cannabis  US b hydro  Previous urien cx E coli  Current meds   ampicillin-sulbactam (UNASYN) 3000 mg ivpb minibag, Q6H  cefTRIAXone (ROCEPHIN) 2,000 mg in sodium chloride (PF) 20 mL IV syringe, Q24H    She is currently comfortable eating lunch   she has no questions or concerns    DOS  22   Awake and alert in bed has no c/o     REVIEW OF SYSTEMS     CONSTITUTIONAL:   No fever, chills, weight loss  ALLERGIES:    No urticaria, hay fever,    EYES:     No blurry vision, loss of vision, eye pain  ENT:      No hearing loss, sore throat  CARDIOVASCULAR:  No chest pain or palpitations  RESPIRATORY:   No cough, sob  ENDOCRINE:    No increase thirst, urination   HEME-LYMPH:   No easy bruising or bleeding  GI:     No nausea, vomiting or diarrhea  :      urinary complaints  NEURO:    No seizures, stroke, HA  MUSCULOSKELETAL:  No muscle aches or pain, no joint pain  SKIN:     No rash or itch  PSYCH:    No depression or anxiety    Medications Prior to Admission: Prenatal MV-Min-Fe Fum-FA-DHA (PRENATAL 1 PO), Take by mouth  CURRENT MEDICATIONS     Current Facility-Administered Medications:     ferrous sulfate (IRON 325) tablet 325 mg, 325 mg, Oral, BID WC, Antonio Ridley MD, 325 mg at 21 0913    vitamin C tablet 250 mg, 250 mg, Oral, BID, Antonio Ridley MD, 250 mg at 21 1015    ampicillin-sulbactam (UNASYN) 3000 mg ivpb minibag, 3,000 mg, IntraVENous, Q6H, Antonio Ridley MD, Last Rate: 200 mL/hr at 21 1441, 3,000 mg at 21 1441    cefTRIAXone (ROCEPHIN) 2,000 mg in sodium chloride (PF) 20 mL IV syringe, 2,000 mg, IntraVENous, Q24H, Antonio Ridley MD, 2,000 mg at 21 2136    sodium chloride flush 0.9 % injection 5-40 mL, 5-40 mL, IntraVENous, BID, Antonio Ridley MD, 10 mL at 21 2142    sodium chloride flush 0.9 % injection 5-40 mL, 5-40 mL, IntraVENous, PRN, Antonio Ridley MD, 10 mL at 21 2135    lactated ringers infusion, , IntraVENous, Continuous, Antonio Ridley MD, Last Rate: 125 mL/hr at 21 1043, New Bag at 21 1043    acetaminophen (TYLENOL) tablet 650 mg, 650 mg, Oral, Q4H PRN, Antonio Ridley MD, 650 mg at 21 0100    ondansetron (ZOFRAN) injection 4 mg, 4 mg, IntraVENous, Q6H PRN, Antonio Ridley MD, 4 mg at 21 2343  ALLERGIES     Seasonal  Immunization History   Administered Date(s) Administered    Influenza, Quadv, 6 mo and older, IM, PF (Flulaval, Fluarix) 2019    MMR 2019    Tdap (Boostrix, Adacel) 2019      Internal Administration   First Dose      Second Dose           Last COVID Lab SARS-CoV-2, NAAT (no units)   Date Value   2021 Not Detected            PAST MEDICAL HISTORY   No past medical history on file.   SURGICAL HISTORY       Past Surgical History:   Procedure Laterality Date    CERVICAL CERCLAGE       SECTION N/A 2019     SECTION performed by Lanie Ibarra MD at [facility] L&D    [place] OFFICE/OUTPT VISIT,PROCEDURE ONLY N/A 11/8/2018    CERVIX CERCLAGE performed by Martin Valles MD at [address]     ·      [address]:    12/12/21 2100 12/12/21 2344 12/13/21 0456 12/13/21 0740   BP: (!) 108/55 113/63 (!) 94/53 (!) 102/56   Pulse: 81 115 69 67   Resp: 15 14 15 18   Temp: 98.4 °F (36.9 °C) 101.4 °F (38.6 °C) 97.9 °F (36.6 °C) 97.6 °F (36.4 °C)   TempSrc: Oral Oral Oral Oral   SpO2:   98% 98%   Weight:       Height:         Physical Exam   Constitutional/General: Alert and oriented, NAD  Head: NC/AT  Eyes: PERRL, EOMI   Pulmonary: Lungs clear to auscultation bilaterally. Cardiovascular:  Regular rate and rhythm,   Abdomen: Soft, + BS.   distension. Nontender. Extremities: Moves all extremities x 4. Skin: Warm and dry without rash  Neurologic:    No focal deficits  Psych: Normal Affect     DIAGNOSTIC RESULTS   RADIOLOGY:   US OB 14 PLUS WEEKS SINGLE OR FIRST GESTATION    Result Date: 12/11/2021  EXAMINATION: TRANSABDOMINAL SECOND/THIRD TRIMESTER OBSTETRIC PELVIC ULTRASOUND WITH COLOR DOPPLER FLOW; BIOPHYSICAL PROFILE WITHOUT NON-STRESS TEST 12/11/2021 TECHNIQUE: TRANSABDOMINAL PELVIC ULTRASOUND WITH COLOR DOPPLER FLOW; ULTRASOUND BIOPHYSICAL PROFILE WITHOUT NON-STRESS TEST COMPARISON: None available. HISTORY: ORDERING SYSTEM PROVIDED HISTORY: limited prenatal visits TECHNOLOGIST PROVIDED HISTORY: Reason for exam:->limited prenatal visits What reading provider will be dictating this exam?->CRC; ORDERING SYSTEM PROVIDED HISTORY: fetal wellbeing TECHNOLOGIST PROVIDED HISTORY: With MARIAELENA please Reason for exam:->fetal wellbeing What reading provider will be dictating this exam?->CRC FINDINGS: A single live intrauterine pregnancy is present. Fetal heart rate measures 150 beats per minute. There is normal fetal body and limb movement. The fetus is in cephalic position.  The placenta is located left lateral. The amniotic fluid volume is visually normal and the amniotic fluid index measures 13.73 cm. The cervix is not well seen secondary to shadowing from the fetal calvarium. Systolic to diastolic ratios were 2.0, 2.2, and 2.0 (normal for gestational age). BPD measures 8.73 cm. 35 weeks and 2 days. Head circumference measures 31.23 cm. 35 weeks and 0 days. Abdominal circumference measures 30.51 cm. 34 weeks and 3 days. Femur length measures 6.75 cm. 34 weeks and 5 days. Estimated fetal weight is 2483 grams which correlates to 27th percentile based upon last menstrual period. BIOPHYSICAL PROFILE: Fetal Tone:  2/2 Gross Body:  2/2 Breathin/2 Qualitative Fluid:  2/2 Biophysical Profile Score:  8/8 Estimated gestational age by current ultrasound is 34 weeks and 6 days Estimated date of delivery based on current ultrasound: 2022 Clinical age provided: 27 weeks and 3 days Estimated date of delivery based on clinical age provided: 2022.     1.  Single live intrauterine pregnancy with gestational age of 29 weeks and 6 days by current sonographic biometry which is concordant with the gestational age provided of 27 weeks and 3 days. The estimated due date based on current ultrasound is 8120. 2.  Cephalic presentation with no evidence of previa. Fetal heart rate was 150 beats per minute. Visually normal amniotic fluid. Estimated fetal weight was 2483 g. 3.  Normal biophysical profile, 8 of 8. Systolic to diastolic ratios ranged from 2.0-2.2.     US FETAL BIOPHYSICAL PROFILE WO NON STRESS TESTING    Result Date: 2021  EXAMINATION: TRANSABDOMINAL SECOND/THIRD TRIMESTER OBSTETRIC PELVIC ULTRASOUND WITH COLOR DOPPLER FLOW; BIOPHYSICAL PROFILE WITHOUT NON-STRESS TEST 2021 TECHNIQUE: TRANSABDOMINAL PELVIC ULTRASOUND WITH COLOR DOPPLER FLOW; ULTRASOUND BIOPHYSICAL PROFILE WITHOUT NON-STRESS TEST COMPARISON: None available.  HISTORY: ORDERING SYSTEM PROVIDED HISTORY: limited prenatal visits TECHNOLOGIST PROVIDED HISTORY: Reason for exam:->limited prenatal visits What reading provider will be dictating this exam?->CRC; ORDERING SYSTEM PROVIDED HISTORY: fetal wellbeing TECHNOLOGIST PROVIDED HISTORY: With MARIAELENA please Reason for exam:->fetal wellbeing What reading provider will be dictating this exam?->CRC FINDINGS: A single live intrauterine pregnancy is present. Fetal heart rate measures 150 beats per minute. There is normal fetal body and limb movement. The fetus is in cephalic position. The placenta is located left lateral. The amniotic fluid volume is visually normal and the amniotic fluid index measures 13.73 cm. The cervix is not well seen secondary to shadowing from the fetal calvarium. Systolic to diastolic ratios were 2.0, 2.2, and 2.0 (normal for gestational age). BPD measures 8.73 cm. 35 weeks and 2 days. Head circumference measures 31.23 cm. 35 weeks and 0 days. Abdominal circumference measures 30.51 cm. 34 weeks and 3 days. Femur length measures 6.75 cm. 34 weeks and 5 days. Estimated fetal weight is 2483 grams which correlates to 27th percentile based upon last menstrual period. BIOPHYSICAL PROFILE: Fetal Tone:  2/2 Gross Body:  2/2 Breathin/2 Qualitative Fluid:  2/2 Biophysical Profile Score:  8/8 Estimated gestational age by current ultrasound is 34 weeks and 6 days Estimated date of delivery based on current ultrasound: 2022 Clinical age provided: 27 weeks and 3 days Estimated date of delivery based on clinical age provided: 2022.     1.  Single live intrauterine pregnancy with gestational age of 29 weeks and 6 days by current sonographic biometry which is concordant with the gestational age provided of 27 weeks and 3 days. The estimated due date based on current ultrasound is 5105. 2.  Cephalic presentation with no evidence of previa. Fetal heart rate was 150 beats per minute. Visually normal amniotic fluid.   Estimated fetal weight was 2483 Non-Reactive Non-Reactive Final       MICROBIOLOGY:       Lab Results   Component Value Date    BC 24 Hours no growth 12/11/2021    BC 5 Days- no growth 01/01/2019    ORG Escherichia coli 12/11/2021    ORG Escherichia coli 11/15/2021    ORG FILM ARR Rhinovirus/Enterovirus Detected  01/01/2019     Lab Results   Component Value Date    BLOODCULT2 24 Hours no growth 12/11/2021    BLOODCULT2 5 Days- no growth 01/01/2019    ORG Escherichia coli 12/11/2021    ORG Escherichia coli 11/15/2021    ORG FILM ARR Rhinovirus/Enterovirus Detected  01/01/2019       Body Fluid Culture, Sterile   Date Value Ref Range Status   01/08/2019 Growth not present  Final     No results found for: CXSURG  Urine Culture, Routine   Date Value Ref Range Status   12/11/2021 >100,000 CFU/ml  Sensitivity to follow    Preliminary   11/15/2021 75,000 CFU/ml  Final       FINAL IMPRESSION    Patient is a 22 y.o. female who presented with   Chief Complaint   Patient presents with    Pregnancy Problem     35 weeks 3 days  pelvic pressure    and admitted for 35 weeks gestation of pregnancy [Z3A.35]  Pyelonephritis [N12]   sepsos  leukocytsosi    complicated uti/E coli/hydronephosis? pyelonephritis  -cont atbx  Sx better  Await final cx prelim E coli   Blood c xngtd       ampicillin-sulbactam (UNASYN) 3000 mg ivpb minibag, Q6H  cefTRIAXone (ROCEPHIN) 2,000 mg in sodium chloride (PF) 20 mL IV syringe, Q24H    Check cx    Imaging and labs were reviewed per medical records        An opportunity to ask questions was given to the patient/FAMILY and questions were answered. Thank you for involving me in the care of [facility]. Please do not hesitate to call for any questions or concerns.          Electronically signed by Sae Rojas MD on 12/13/2021 at 3:20 PM yes

## (undated) DEVICE — CESAREAN BIRTH PACK: Brand: MEDLINE INDUSTRIES, INC.

## (undated) DEVICE — CONTAINER SPEC 64OZ POLYPR PATH SNAP LOK CAP W/ LID

## (undated) DEVICE — AMD ANTIMICROBIAL BANDAGE ROLL,6 PLY: Brand: KERLIX

## (undated) DEVICE — APPLICATOR PREP 26ML 0.7% IOD POVACRYLEX 74% ISO ALC ST

## (undated) DEVICE — PAD MATERNITY CURITY ADH STRIP DISP

## (undated) DEVICE — CATHETERIZATION KIT FOL16 FR 2000 CC DRAINAGE BG LUBRICATH

## (undated) DEVICE — Device: Brand: PORTEX

## (undated) DEVICE — CURETTE VACUUM 3.1MM SUCTION ENDOMETRIAL

## (undated) DEVICE — BINDER ABD M/L H12IN FOR 46-62IN WHT 4 SLD PNL DSGN HOOP

## (undated) DEVICE — STERILE POLYISOPRENE POWDER-FREE SURGICAL GLOVES WITH EMOLLIENT COATING: Brand: PROTEXIS

## (undated) DEVICE — HYPODERMIC SAFETY NEEDLE: Brand: MAGELLAN

## (undated) DEVICE — COUNTER NDL 30 COUNT DBL MAG

## (undated) DEVICE — SUTURE STRATAFIX SPRL SZ 1 L14IN ABSRB VLT L48CM CTX 1/2 SXPD2B405

## (undated) DEVICE — CESAREAN BIRTH PACK II: Brand: MEDLINE INDUSTRIES, INC.

## (undated) DEVICE — GOWN,SIRUS,FABRNF,XL,20/CS: Brand: MEDLINE

## (undated) DEVICE — MEDI-VAC YANKAUER SUCTION HANDLE W/BULBOUS TIP: Brand: CARDINAL HEALTH

## (undated) DEVICE — SUTURE CHROMIC GUT SZ 1 L36IN ABSRB BRN L48MM CTX 1/2 CIR 905H

## (undated) DEVICE — MARKER,SKIN,WI/RULER AND LABELS: Brand: MEDLINE

## (undated) DEVICE — GOWN,SIRUS,NONRNF,SETINSLV,XL,20/CS: Brand: MEDLINE

## (undated) DEVICE — GOWN,SIRUS,FABRNF,L,20/CS: Brand: MEDLINE

## (undated) DEVICE — GAUZE,SPONGE,4"X4",16PLY,XRAY,STRL,LF: Brand: MEDLINE

## (undated) DEVICE — TUBE BLD COLLECT ST 1 SIL COAT 7ML 10ML

## (undated) DEVICE — SUTURE VCRL SZ 4-0 L18IN ABSRB UD L19MM PS-2 3/8 CIR PRIM J496H

## (undated) DEVICE — BLADE CLIPPER GEN PURP NS

## (undated) DEVICE — NEEDLE SPNL 22GA L3.5IN BLK HUB S STL REG WALL FIT STYL W/

## (undated) DEVICE — SUTURE VCRL SZ 3-0 L36IN ABSRB VLT CT L40MM 1/2 CIR J356H

## (undated) DEVICE — GLOVE SURG SZ 65 THK91MIL LTX FREE SYN POLYISOPRENE

## (undated) DEVICE — PACK PROC 3IN1 W/ L12FT DIA0.25IN REINF SUCT TBNG W50XL901IN

## (undated) DEVICE — TUBING, SUCTION, 3/16" X 12', STRAIGHT: Brand: MEDLINE

## (undated) DEVICE — CATHETER,URETHRAL,VINYL,MALE,16",16 FR: Brand: MEDLINE

## (undated) DEVICE — SPONGE LAP W18XL18IN WHT COT 4 PLY FLD STRUNG RADPQ DISP ST

## (undated) DEVICE — STANDARD HYPODERMIC NEEDLE,POLYPROPYLENE HUB: Brand: MONOJECT

## (undated) DEVICE — TRAY,VAG PREP,2PR VNYL GLV,4 C: Brand: MEDLINE INDUSTRIES, INC.

## (undated) DEVICE — SUTURE VCRL + SZ 0 L36IN ABSRB VLT L36MM CT-1 1/2 CIR VCP346H

## (undated) DEVICE — SUTURE CHROMIC GUT 0 L36IN CT-1 L36MM 1/2 CIR TAPERPOINT 924H

## (undated) DEVICE — CHLORAPREP 26ML ORANGE

## (undated) DEVICE — TRAY PROCED DILATATION CURETTAGE

## (undated) DEVICE — TOWEL,OR,DSP,ST,BLUE,STD,6/PK,12PK/CS: Brand: MEDLINE

## (undated) DEVICE — BLADE SURG NO20 S STL STR DISP GLASSVAN

## (undated) DEVICE — SHEET,DRAPE,53X77,STERILE: Brand: MEDLINE

## (undated) DEVICE — 3000CC GUARDIAN II: Brand: GUARDIAN

## (undated) DEVICE — GOWN,SIRUS,POLYRNF,BRTHSLV,XLN/XL,20/CS: Brand: MEDLINE

## (undated) DEVICE — ELECTRODE PT RET AD L9FT HI MOIST COND ADH HYDRGEL CORDED

## (undated) DEVICE — PENCIL ES L3M BTTN SWCH HOLSTER W/ BLDE ELECTRD EDGE

## (undated) DEVICE — CONTAINER,SPEC,PNEUM TUBE,3OZ,STRL PATH: Brand: MEDLINE

## (undated) DEVICE — GLOVE SURG SZ 75 L12IN FNGR THK83MIL CRM POLYISOPRENE

## (undated) DEVICE — SUTURE ABSRB CHROMIC GUT MFIL CTX NO 1 27IN 865H

## (undated) DEVICE — STRIP,CLOSURE,WOUND,MEDI-STRIP,1/2X4: Brand: MEDLINE

## (undated) DEVICE — 3M™ STERI-STRIP™ ELASTIC SKIN CLOSURES, E4547, 1/2 IN X 4 IN (12 MM X 100 MM), 6 STRIPS/ENVELOPE: Brand: 3M™ STERI-STRIP™

## (undated) DEVICE — LINER,SOFT,SUCTION CANISTER,1500CC: Brand: MEDLINE

## (undated) DEVICE — SLEEVE COMPRESS STD CALF KNEE SCD

## (undated) DEVICE — SCALPEL SURG NO10 S STL ABS PLAS HNDL DISPOSABLE

## (undated) DEVICE — SCALPEL SURG NO21 S STL STR W/ INTEGR MTRC RUL DISP

## (undated) DEVICE — 3M™ STERI-STRIP™ COMPOUND BENZOIN TINCTURE 40 BAGS/CARTON 4 CARTONS/CASE C1544: Brand: 3M™ STERI-STRIP™

## (undated) DEVICE — DOUBLE BASIN SET: Brand: MEDLINE INDUSTRIES, INC.

## (undated) DEVICE — PEN: MARKING STD 100/CS: Brand: MEDICAL ACTION INDUSTRIES

## (undated) DEVICE — COVER,LIGHT HANDLE,FLX,2/PK: Brand: MEDLINE INDUSTRIES, INC.

## (undated) DEVICE — GLOVE SURG SZ 65 L12IN FNGR THK83MIL CRM POLYISOPRENE

## (undated) DEVICE — STAPLER SKIN SQ 30 ABSRB STPL DISP INSORB